# Patient Record
Sex: FEMALE | Race: WHITE | NOT HISPANIC OR LATINO | Employment: FULL TIME | URBAN - METROPOLITAN AREA
[De-identification: names, ages, dates, MRNs, and addresses within clinical notes are randomized per-mention and may not be internally consistent; named-entity substitution may affect disease eponyms.]

---

## 2019-07-08 ENCOUNTER — APPOINTMENT (OUTPATIENT)
Dept: CARDIOLOGY | Facility: HOSPITAL | Age: 54
End: 2019-07-08

## 2019-07-08 ENCOUNTER — HOSPITAL ENCOUNTER (INPATIENT)
Facility: HOSPITAL | Age: 54
LOS: 1 days | Discharge: HOME | End: 2019-07-09
Attending: INTERNAL MEDICINE | Admitting: INTERNAL MEDICINE

## 2019-07-08 ENCOUNTER — APPOINTMENT (OUTPATIENT)
Dept: GENERAL RADIOLOGY | Facility: HOSPITAL | Age: 54
End: 2019-07-08

## 2019-07-08 DIAGNOSIS — I21.3 ST ELEVATION MYOCARDIAL INFARCTION (STEMI), UNSPECIFIED ARTERY (HCC): Primary | ICD-10-CM

## 2019-07-08 DIAGNOSIS — I21.21 ST ELEVATION MYOCARDIAL INFARCTION INVOLVING LEFT CIRCUMFLEX CORONARY ARTERY (HCC): ICD-10-CM

## 2019-07-08 DIAGNOSIS — E78.5 HYPERLIPIDEMIA LDL GOAL <70: ICD-10-CM

## 2019-07-08 DIAGNOSIS — I10 ESSENTIAL HYPERTENSION: ICD-10-CM

## 2019-07-08 PROBLEM — Z72.0 TOBACCO ABUSE: Status: ACTIVE | Noted: 2019-07-08

## 2019-07-08 LAB
ACT BLD: 444 SECONDS (ref 82–152)
ALBUMIN SERPL-MCNC: 3.9 G/DL (ref 3.5–5.2)
ALBUMIN/GLOB SERPL: 1.4 G/DL
ALP SERPL-CCNC: 92 U/L (ref 39–117)
ALT SERPL W P-5'-P-CCNC: 31 U/L (ref 1–33)
ANION GAP SERPL CALCULATED.3IONS-SCNC: 11 MMOL/L (ref 5–15)
AST SERPL-CCNC: 115 U/L (ref 1–32)
BASOPHILS # BLD AUTO: 0.08 10*3/MM3 (ref 0–0.2)
BASOPHILS NFR BLD AUTO: 0.7 % (ref 0–1.5)
BH CV ECHO MEAS - AO MAX PG (FULL): 4.8 MMHG
BH CV ECHO MEAS - AO MAX PG: 8.9 MMHG
BH CV ECHO MEAS - AO MEAN PG (FULL): 2.2 MMHG
BH CV ECHO MEAS - AO MEAN PG: 4.6 MMHG
BH CV ECHO MEAS - AO ROOT AREA (BSA CORRECTED): 1.5
BH CV ECHO MEAS - AO ROOT AREA: 6.5 CM^2
BH CV ECHO MEAS - AO ROOT DIAM: 2.9 CM
BH CV ECHO MEAS - AO V2 MAX: 148.8 CM/SEC
BH CV ECHO MEAS - AO V2 MEAN: 96.4 CM/SEC
BH CV ECHO MEAS - AO V2 VTI: 39.4 CM
BH CV ECHO MEAS - ASC AORTA: 2.7 CM
BH CV ECHO MEAS - AVA(I,A): 2.1 CM^2
BH CV ECHO MEAS - AVA(I,D): 2.1 CM^2
BH CV ECHO MEAS - AVA(V,A): 2.1 CM^2
BH CV ECHO MEAS - AVA(V,D): 2.1 CM^2
BH CV ECHO MEAS - BSA(HAYCOCK): 2 M^2
BH CV ECHO MEAS - BSA: 1.9 M^2
BH CV ECHO MEAS - BZI_BMI: 29.9 KILOGRAMS/M^2
BH CV ECHO MEAS - BZI_METRIC_HEIGHT: 167.6 CM
BH CV ECHO MEAS - BZI_METRIC_WEIGHT: 83.9 KG
BH CV ECHO MEAS - EDV(CUBED): 88.1 ML
BH CV ECHO MEAS - EDV(MOD-SP2): 66 ML
BH CV ECHO MEAS - EDV(MOD-SP4): 66 ML
BH CV ECHO MEAS - EDV(TEICH): 90 ML
BH CV ECHO MEAS - EF(CUBED): 78.5 %
BH CV ECHO MEAS - EF(MOD-BP): 54 %
BH CV ECHO MEAS - EF(MOD-SP2): 53 %
BH CV ECHO MEAS - EF(MOD-SP4): 51.5 %
BH CV ECHO MEAS - EF(TEICH): 71 %
BH CV ECHO MEAS - ESV(CUBED): 18.9 ML
BH CV ECHO MEAS - ESV(MOD-SP2): 31 ML
BH CV ECHO MEAS - ESV(MOD-SP4): 32 ML
BH CV ECHO MEAS - ESV(TEICH): 26.1 ML
BH CV ECHO MEAS - FS: 40.1 %
BH CV ECHO MEAS - IVS/LVPW: 0.68
BH CV ECHO MEAS - IVSD: 0.87 CM
BH CV ECHO MEAS - LA DIMENSION: 3.7 CM
BH CV ECHO MEAS - LA/AO: 1.3
BH CV ECHO MEAS - LAD MAJOR: 5 CM
BH CV ECHO MEAS - LAT PEAK E' VEL: 6.3 CM/SEC
BH CV ECHO MEAS - LATERAL E/E' RATIO: 15.9
BH CV ECHO MEAS - LV DIASTOLIC VOL/BSA (35-75): 34.1 ML/M^2
BH CV ECHO MEAS - LV MASS(C)D: 165.8 GRAMS
BH CV ECHO MEAS - LV MASS(C)DI: 85.7 GRAMS/M^2
BH CV ECHO MEAS - LV MAX PG: 4 MMHG
BH CV ECHO MEAS - LV MEAN PG: 2.4 MMHG
BH CV ECHO MEAS - LV SYSTOLIC VOL/BSA (12-30): 16.5 ML/M^2
BH CV ECHO MEAS - LV V1 MAX: 100.2 CM/SEC
BH CV ECHO MEAS - LV V1 MEAN: 72.4 CM/SEC
BH CV ECHO MEAS - LV V1 VTI: 27 CM
BH CV ECHO MEAS - LVIDD: 4.4 CM
BH CV ECHO MEAS - LVIDS: 2.7 CM
BH CV ECHO MEAS - LVLD AP2: 7.1 CM
BH CV ECHO MEAS - LVLD AP4: 8 CM
BH CV ECHO MEAS - LVLS AP2: 6.5 CM
BH CV ECHO MEAS - LVLS AP4: 7 CM
BH CV ECHO MEAS - LVOT AREA (M): 3.1 CM^2
BH CV ECHO MEAS - LVOT AREA: 3.1 CM^2
BH CV ECHO MEAS - LVOT DIAM: 2 CM
BH CV ECHO MEAS - LVPWD: 1.3 CM
BH CV ECHO MEAS - MED PEAK E' VEL: 6 CM/SEC
BH CV ECHO MEAS - MEDIAL E/E' RATIO: 16.4
BH CV ECHO MEAS - MV A MAX VEL: 125.4 CM/SEC
BH CV ECHO MEAS - MV DEC TIME: 0.15 SEC
BH CV ECHO MEAS - MV E MAX VEL: 101.2 CM/SEC
BH CV ECHO MEAS - MV E/A: 0.81
BH CV ECHO MEAS - PA ACC SLOPE: 395.2 CM/SEC^2
BH CV ECHO MEAS - PA ACC TIME: 0.15 SEC
BH CV ECHO MEAS - PA MAX PG: 3.3 MMHG
BH CV ECHO MEAS - PA PR(ACCEL): 9.3 MMHG
BH CV ECHO MEAS - PA V2 MAX: 90.9 CM/SEC
BH CV ECHO MEAS - RVDD: 2.5 CM
BH CV ECHO MEAS - SI(AO): 133.1 ML/M^2
BH CV ECHO MEAS - SI(CUBED): 35.8 ML/M^2
BH CV ECHO MEAS - SI(LVOT): 43.7 ML/M^2
BH CV ECHO MEAS - SI(MOD-SP2): 18.1 ML/M^2
BH CV ECHO MEAS - SI(MOD-SP4): 17.6 ML/M^2
BH CV ECHO MEAS - SI(TEICH): 33 ML/M^2
BH CV ECHO MEAS - SV(AO): 257.5 ML
BH CV ECHO MEAS - SV(CUBED): 69.2 ML
BH CV ECHO MEAS - SV(LVOT): 84.6 ML
BH CV ECHO MEAS - SV(MOD-SP2): 35 ML
BH CV ECHO MEAS - SV(MOD-SP4): 34 ML
BH CV ECHO MEAS - SV(TEICH): 63.9 ML
BH CV ECHO MEAS - TAPSE (>1.6): 2.4 CM2
BH CV ECHO MEAS - TV MAX PG: 0.04 MMHG
BH CV ECHO MEAS - TV V2 MAX: 10.4 CM/SEC
BH CV ECHO MEASUREMENTS AVERAGE E/E' RATIO: 16.46
BH CV VAS BP RIGHT ARM: NORMAL MMHG
BH CV XLRA - RV BASE: 3.4 CM
BH CV XLRA - RV LENGTH: 7.4 CM
BH CV XLRA - RV MID: 3.2 CM
BH CV XLRA - TDI S': 10.3 CM/SEC
BILIRUB SERPL-MCNC: 0.3 MG/DL (ref 0.2–1.2)
BUN BLD-MCNC: 13 MG/DL (ref 6–20)
BUN/CREAT SERPL: 12.5 (ref 7–25)
CALCIUM SPEC-SCNC: 9 MG/DL (ref 8.6–10.5)
CHLORIDE SERPL-SCNC: 95 MMOL/L (ref 98–107)
CHOLEST SERPL-MCNC: 165 MG/DL (ref 0–200)
CO2 SERPL-SCNC: 25 MMOL/L (ref 22–29)
CREAT BLD-MCNC: 1.04 MG/DL (ref 0.57–1)
DEPRECATED RDW RBC AUTO: 49.5 FL (ref 37–54)
EOSINOPHIL # BLD AUTO: 0.03 10*3/MM3 (ref 0–0.4)
EOSINOPHIL NFR BLD AUTO: 0.3 % (ref 0.3–6.2)
ERYTHROCYTE [DISTWIDTH] IN BLOOD BY AUTOMATED COUNT: 14 % (ref 12.3–15.4)
GFR SERPL CREATININE-BSD FRML MDRD: 55 ML/MIN/1.73
GLOBULIN UR ELPH-MCNC: 2.7 GM/DL
GLUCOSE BLD-MCNC: 134 MG/DL (ref 65–99)
HBA1C MFR BLD: 5.7 % (ref 4.8–5.6)
HCT VFR BLD AUTO: 47.2 % (ref 34–46.6)
HDLC SERPL-MCNC: 49 MG/DL (ref 40–60)
HGB BLD-MCNC: 15.1 G/DL (ref 12–15.9)
IMM GRANULOCYTES # BLD AUTO: 0.07 10*3/MM3 (ref 0–0.05)
IMM GRANULOCYTES NFR BLD AUTO: 0.6 % (ref 0–0.5)
LDLC SERPL CALC-MCNC: 105 MG/DL (ref 0–100)
LDLC/HDLC SERPL: 2.13 {RATIO}
LIPASE SERPL-CCNC: 35 U/L (ref 13–60)
LV EF 2D ECHO EST: 60 %
LYMPHOCYTES # BLD AUTO: 1.12 10*3/MM3 (ref 0.7–3.1)
LYMPHOCYTES NFR BLD AUTO: 9.7 % (ref 19.6–45.3)
MAGNESIUM SERPL-MCNC: 1.8 MG/DL (ref 1.6–2.6)
MCH RBC QN AUTO: 30.9 PG (ref 26.6–33)
MCHC RBC AUTO-ENTMCNC: 32 G/DL (ref 31.5–35.7)
MCV RBC AUTO: 96.7 FL (ref 79–97)
MONOCYTES # BLD AUTO: 0.58 10*3/MM3 (ref 0.1–0.9)
MONOCYTES NFR BLD AUTO: 5 % (ref 5–12)
NEUTROPHILS # BLD AUTO: 9.67 10*3/MM3 (ref 1.7–7)
NEUTROPHILS NFR BLD AUTO: 83.7 % (ref 42.7–76)
NRBC BLD AUTO-RTO: 0 /100 WBC (ref 0–0.2)
NT-PROBNP SERPL-MCNC: 1508 PG/ML (ref 5–900)
PLATELET # BLD AUTO: 225 10*3/MM3 (ref 140–450)
PMV BLD AUTO: 10.5 FL (ref 6–12)
POTASSIUM BLD-SCNC: 4 MMOL/L (ref 3.5–5.2)
PROT SERPL-MCNC: 6.6 G/DL (ref 6–8.5)
RBC # BLD AUTO: 4.88 10*6/MM3 (ref 3.77–5.28)
SODIUM BLD-SCNC: 131 MMOL/L (ref 136–145)
TRIGL SERPL-MCNC: 57 MG/DL (ref 0–150)
TROPONIN T SERPL-MCNC: 7.1 NG/ML (ref 0–0.03)
TSH SERPL DL<=0.05 MIU/L-ACNC: 3.33 MIU/ML (ref 0.27–4.2)
VLDLC SERPL-MCNC: 11.4 MG/DL
WBC NRBC COR # BLD: 11.55 10*3/MM3 (ref 3.4–10.8)

## 2019-07-08 PROCEDURE — 83036 HEMOGLOBIN GLYCOSYLATED A1C: CPT | Performed by: INTERNAL MEDICINE

## 2019-07-08 PROCEDURE — 93010 ELECTROCARDIOGRAM REPORT: CPT | Performed by: INTERNAL MEDICINE

## 2019-07-08 PROCEDURE — 80061 LIPID PANEL: CPT | Performed by: INTERNAL MEDICINE

## 2019-07-08 PROCEDURE — 93454 CORONARY ARTERY ANGIO S&I: CPT | Performed by: INTERNAL MEDICINE

## 2019-07-08 PROCEDURE — 92941 PRQ TRLML REVSC TOT OCCL AMI: CPT | Performed by: INTERNAL MEDICINE

## 2019-07-08 PROCEDURE — 92978 ENDOLUMINL IVUS OCT C 1ST: CPT | Performed by: INTERNAL MEDICINE

## 2019-07-08 PROCEDURE — 4A023N7 MEASUREMENT OF CARDIAC SAMPLING AND PRESSURE, LEFT HEART, PERCUTANEOUS APPROACH: ICD-10-PCS | Performed by: INTERNAL MEDICINE

## 2019-07-08 PROCEDURE — 84443 ASSAY THYROID STIM HORMONE: CPT | Performed by: INTERNAL MEDICINE

## 2019-07-08 PROCEDURE — C1725 CATH, TRANSLUMIN NON-LASER: HCPCS | Performed by: INTERNAL MEDICINE

## 2019-07-08 PROCEDURE — 71045 X-RAY EXAM CHEST 1 VIEW: CPT

## 2019-07-08 PROCEDURE — 92979 ENDOLUMINL IVUS OCT C EA: CPT | Performed by: INTERNAL MEDICINE

## 2019-07-08 PROCEDURE — 25010000002 HEPARIN (PORCINE) PER 1000 UNITS: Performed by: EMERGENCY MEDICINE

## 2019-07-08 PROCEDURE — 0 IOPAMIDOL PER 1 ML: Performed by: INTERNAL MEDICINE

## 2019-07-08 PROCEDURE — 92928 PRQ TCAT PLMT NTRAC ST 1 LES: CPT | Performed by: INTERNAL MEDICINE

## 2019-07-08 PROCEDURE — C1769 GUIDE WIRE: HCPCS | Performed by: INTERNAL MEDICINE

## 2019-07-08 PROCEDURE — C1887 CATHETER, GUIDING: HCPCS | Performed by: INTERNAL MEDICINE

## 2019-07-08 PROCEDURE — 25010000002 FENTANYL CITRATE (PF) 100 MCG/2ML SOLUTION: Performed by: INTERNAL MEDICINE

## 2019-07-08 PROCEDURE — 93005 ELECTROCARDIOGRAM TRACING: CPT | Performed by: INTERNAL MEDICINE

## 2019-07-08 PROCEDURE — 83735 ASSAY OF MAGNESIUM: CPT | Performed by: INTERNAL MEDICINE

## 2019-07-08 PROCEDURE — 25010000002 MIDAZOLAM PER 1 MG: Performed by: INTERNAL MEDICINE

## 2019-07-08 PROCEDURE — C9606 PERC D-E COR REVASC W AMI S: HCPCS | Performed by: INTERNAL MEDICINE

## 2019-07-08 PROCEDURE — 93005 ELECTROCARDIOGRAM TRACING: CPT

## 2019-07-08 PROCEDURE — 93306 TTE W/DOPPLER COMPLETE: CPT | Performed by: INTERNAL MEDICINE

## 2019-07-08 PROCEDURE — C1874 STENT, COATED/COV W/DEL SYS: HCPCS | Performed by: INTERNAL MEDICINE

## 2019-07-08 PROCEDURE — 83880 ASSAY OF NATRIURETIC PEPTIDE: CPT | Performed by: EMERGENCY MEDICINE

## 2019-07-08 PROCEDURE — C1894 INTRO/SHEATH, NON-LASER: HCPCS | Performed by: INTERNAL MEDICINE

## 2019-07-08 PROCEDURE — 84484 ASSAY OF TROPONIN QUANT: CPT | Performed by: INTERNAL MEDICINE

## 2019-07-08 PROCEDURE — C9600 PERC DRUG-EL COR STENT SING: HCPCS | Performed by: INTERNAL MEDICINE

## 2019-07-08 PROCEDURE — 85025 COMPLETE CBC W/AUTO DIFF WBC: CPT | Performed by: EMERGENCY MEDICINE

## 2019-07-08 PROCEDURE — 99284 EMERGENCY DEPT VISIT MOD MDM: CPT

## 2019-07-08 PROCEDURE — B2111ZZ FLUOROSCOPY OF MULTIPLE CORONARY ARTERIES USING LOW OSMOLAR CONTRAST: ICD-10-PCS | Performed by: INTERNAL MEDICINE

## 2019-07-08 PROCEDURE — 25010000002 SULFUR HEXAFLUORIDE MICROSPH 60.7-25 MG RECONSTITUTED SUSPENSION: Performed by: INTERNAL MEDICINE

## 2019-07-08 PROCEDURE — 85347 COAGULATION TIME ACTIVATED: CPT

## 2019-07-08 PROCEDURE — 99223 1ST HOSP IP/OBS HIGH 75: CPT | Performed by: INTERNAL MEDICINE

## 2019-07-08 PROCEDURE — 93005 ELECTROCARDIOGRAM TRACING: CPT | Performed by: EMERGENCY MEDICINE

## 2019-07-08 PROCEDURE — 027135Z DILATION OF CORONARY ARTERY, TWO ARTERIES WITH TWO DRUG-ELUTING INTRALUMINAL DEVICES, PERCUTANEOUS APPROACH: ICD-10-PCS | Performed by: INTERNAL MEDICINE

## 2019-07-08 PROCEDURE — 25010000002 HEPARIN (PORCINE) PER 1000 UNITS: Performed by: INTERNAL MEDICINE

## 2019-07-08 PROCEDURE — 80053 COMPREHEN METABOLIC PANEL: CPT | Performed by: EMERGENCY MEDICINE

## 2019-07-08 PROCEDURE — C1757 CATH, THROMBECTOMY/EMBOLECT: HCPCS | Performed by: INTERNAL MEDICINE

## 2019-07-08 PROCEDURE — B221Z2Z COMPUTERIZED TOMOGRAPHY (CT SCAN) OF MULTIPLE CORONARY ARTERIES USING INTRAVASCULAR OPTICAL COHERENCE: ICD-10-PCS | Performed by: INTERNAL MEDICINE

## 2019-07-08 PROCEDURE — 93306 TTE W/DOPPLER COMPLETE: CPT

## 2019-07-08 PROCEDURE — C1753 CATH, INTRAVAS ULTRASOUND: HCPCS | Performed by: INTERNAL MEDICINE

## 2019-07-08 PROCEDURE — 83690 ASSAY OF LIPASE: CPT | Performed by: EMERGENCY MEDICINE

## 2019-07-08 DEVICE — XIENCE SIERRA™ EVEROLIMUS ELUTING CORONARY STENT SYSTEM 2.75 MM X 33 MM / RAPID-EXCHANGE
Type: IMPLANTABLE DEVICE | Status: FUNCTIONAL
Brand: XIENCE SIERRA™

## 2019-07-08 DEVICE — XIENCE SIERRA™ EVEROLIMUS ELUTING CORONARY STENT SYSTEM 3.00 MM X 28 MM / RAPID-EXCHANGE
Type: IMPLANTABLE DEVICE | Status: FUNCTIONAL
Brand: XIENCE SIERRA™

## 2019-07-08 RX ORDER — LIDOCAINE HYDROCHLORIDE 10 MG/ML
INJECTION, SOLUTION EPIDURAL; INFILTRATION; INTRACAUDAL; PERINEURAL AS NEEDED
Status: DISCONTINUED | OUTPATIENT
Start: 2019-07-08 | End: 2019-07-08 | Stop reason: HOSPADM

## 2019-07-08 RX ORDER — HEPARIN SODIUM 1000 [USP'U]/ML
INJECTION, SOLUTION INTRAVENOUS; SUBCUTANEOUS AS NEEDED
Status: DISCONTINUED | OUTPATIENT
Start: 2019-07-08 | End: 2019-07-08 | Stop reason: HOSPADM

## 2019-07-08 RX ORDER — MIDAZOLAM HYDROCHLORIDE 1 MG/ML
INJECTION INTRAMUSCULAR; INTRAVENOUS AS NEEDED
Status: DISCONTINUED | OUTPATIENT
Start: 2019-07-08 | End: 2019-07-08 | Stop reason: HOSPADM

## 2019-07-08 RX ORDER — ATORVASTATIN CALCIUM 80 MG/1
80 TABLET, FILM COATED ORAL NIGHTLY
Qty: 90 TABLET | Refills: 3 | Status: CANCELLED | OUTPATIENT
Start: 2019-07-08

## 2019-07-08 RX ORDER — LISINOPRIL AND HYDROCHLOROTHIAZIDE 25; 20 MG/1; MG/1
1 TABLET ORAL DAILY
COMMUNITY
End: 2019-07-09 | Stop reason: HOSPADM

## 2019-07-08 RX ORDER — IBUPROFEN 200 MG
200 TABLET ORAL EVERY 6 HOURS PRN
COMMUNITY
End: 2019-07-09 | Stop reason: HOSPADM

## 2019-07-08 RX ORDER — CITALOPRAM 40 MG/1
40 TABLET ORAL DAILY
COMMUNITY
End: 2019-07-16

## 2019-07-08 RX ORDER — MAGNESIUM SULFATE HEPTAHYDRATE 40 MG/ML
2 INJECTION, SOLUTION INTRAVENOUS AS NEEDED
Status: DISCONTINUED | OUTPATIENT
Start: 2019-07-08 | End: 2019-07-09 | Stop reason: HOSPADM

## 2019-07-08 RX ORDER — POTASSIUM CHLORIDE 750 MG/1
40 CAPSULE, EXTENDED RELEASE ORAL AS NEEDED
Status: DISCONTINUED | OUTPATIENT
Start: 2019-07-08 | End: 2019-07-09 | Stop reason: HOSPADM

## 2019-07-08 RX ORDER — LISINOPRIL 20 MG/1
20 TABLET ORAL DAILY
Status: DISCONTINUED | OUTPATIENT
Start: 2019-07-08 | End: 2019-07-09

## 2019-07-08 RX ORDER — SODIUM CHLORIDE 0.9 % (FLUSH) 0.9 %
3-10 SYRINGE (ML) INJECTION AS NEEDED
Status: DISCONTINUED | OUTPATIENT
Start: 2019-07-08 | End: 2019-07-09 | Stop reason: HOSPADM

## 2019-07-08 RX ORDER — ONDANSETRON 2 MG/ML
4 INJECTION INTRAMUSCULAR; INTRAVENOUS EVERY 6 HOURS PRN
Status: DISCONTINUED | OUTPATIENT
Start: 2019-07-08 | End: 2019-07-09 | Stop reason: HOSPADM

## 2019-07-08 RX ORDER — SODIUM CHLORIDE 0.9 % (FLUSH) 0.9 %
10 SYRINGE (ML) INJECTION AS NEEDED
Status: DISCONTINUED | OUTPATIENT
Start: 2019-07-08 | End: 2019-07-09 | Stop reason: HOSPADM

## 2019-07-08 RX ORDER — MAGNESIUM SULFATE HEPTAHYDRATE 40 MG/ML
4 INJECTION, SOLUTION INTRAVENOUS AS NEEDED
Status: DISCONTINUED | OUTPATIENT
Start: 2019-07-08 | End: 2019-07-09 | Stop reason: HOSPADM

## 2019-07-08 RX ORDER — PRASUGREL 5 MG/1
TABLET, FILM COATED ORAL AS NEEDED
Status: DISCONTINUED | OUTPATIENT
Start: 2019-07-08 | End: 2019-07-08 | Stop reason: HOSPADM

## 2019-07-08 RX ORDER — ATORVASTATIN CALCIUM 40 MG/1
80 TABLET, FILM COATED ORAL NIGHTLY
Status: DISCONTINUED | OUTPATIENT
Start: 2019-07-08 | End: 2019-07-09 | Stop reason: HOSPADM

## 2019-07-08 RX ORDER — FENTANYL CITRATE 50 UG/ML
INJECTION, SOLUTION INTRAMUSCULAR; INTRAVENOUS AS NEEDED
Status: DISCONTINUED | OUTPATIENT
Start: 2019-07-08 | End: 2019-07-08 | Stop reason: HOSPADM

## 2019-07-08 RX ORDER — ASPIRIN 81 MG/1
324 TABLET, CHEWABLE ORAL ONCE
Status: DISCONTINUED | OUTPATIENT
Start: 2019-07-08 | End: 2019-07-09 | Stop reason: HOSPADM

## 2019-07-08 RX ORDER — GABAPENTIN 100 MG/1
400 CAPSULE ORAL 3 TIMES DAILY
COMMUNITY

## 2019-07-08 RX ORDER — ASPIRIN 81 MG/1
81 TABLET ORAL DAILY
Status: DISCONTINUED | OUTPATIENT
Start: 2019-07-09 | End: 2019-07-09 | Stop reason: HOSPADM

## 2019-07-08 RX ORDER — SODIUM CHLORIDE 0.9 % (FLUSH) 0.9 %
3 SYRINGE (ML) INJECTION EVERY 12 HOURS SCHEDULED
Status: DISCONTINUED | OUTPATIENT
Start: 2019-07-08 | End: 2019-07-09 | Stop reason: HOSPADM

## 2019-07-08 RX ORDER — CLOPIDOGREL BISULFATE 75 MG/1
TABLET ORAL
Status: COMPLETED | OUTPATIENT
Start: 2019-07-08 | End: 2019-07-08

## 2019-07-08 RX ORDER — CLOPIDOGREL BISULFATE 75 MG/1
75 TABLET ORAL DAILY
Status: DISCONTINUED | OUTPATIENT
Start: 2019-07-08 | End: 2019-07-08

## 2019-07-08 RX ORDER — CLOPIDOGREL BISULFATE 75 MG/1
75 TABLET ORAL DAILY
Qty: 90 TABLET | Refills: 3 | Status: CANCELLED | OUTPATIENT
Start: 2019-07-09

## 2019-07-08 RX ORDER — LORAZEPAM 1 MG/1
1 TABLET ORAL EVERY 6 HOURS PRN
Status: DISCONTINUED | OUTPATIENT
Start: 2019-07-08 | End: 2019-07-09 | Stop reason: HOSPADM

## 2019-07-08 RX ORDER — HYDROCODONE BITARTRATE AND ACETAMINOPHEN 7.5; 325 MG/1; MG/1
1 TABLET ORAL EVERY 4 HOURS PRN
Status: DISCONTINUED | OUTPATIENT
Start: 2019-07-08 | End: 2019-07-09 | Stop reason: HOSPADM

## 2019-07-08 RX ORDER — ACETAMINOPHEN 325 MG/1
650 TABLET ORAL EVERY 4 HOURS PRN
Status: DISCONTINUED | OUTPATIENT
Start: 2019-07-08 | End: 2019-07-09 | Stop reason: HOSPADM

## 2019-07-08 RX ORDER — SODIUM CHLORIDE 9 MG/ML
1.5 INJECTION, SOLUTION INTRAVENOUS CONTINUOUS
Status: ACTIVE | OUTPATIENT
Start: 2019-07-08 | End: 2019-07-08

## 2019-07-08 RX ORDER — POTASSIUM CHLORIDE 1.5 G/1.77G
40 POWDER, FOR SOLUTION ORAL AS NEEDED
Status: DISCONTINUED | OUTPATIENT
Start: 2019-07-08 | End: 2019-07-09 | Stop reason: HOSPADM

## 2019-07-08 RX ORDER — HEPARIN SODIUM 5000 [USP'U]/ML
INJECTION, SOLUTION INTRAVENOUS; SUBCUTANEOUS
Status: COMPLETED | OUTPATIENT
Start: 2019-07-08 | End: 2019-07-08

## 2019-07-08 RX ORDER — ONDANSETRON 4 MG/1
4 TABLET, FILM COATED ORAL EVERY 6 HOURS PRN
Status: DISCONTINUED | OUTPATIENT
Start: 2019-07-08 | End: 2019-07-09 | Stop reason: HOSPADM

## 2019-07-08 RX ORDER — CITALOPRAM 40 MG/1
40 TABLET ORAL DAILY
Status: DISCONTINUED | OUTPATIENT
Start: 2019-07-08 | End: 2019-07-09 | Stop reason: HOSPADM

## 2019-07-08 RX ORDER — CLOPIDOGREL BISULFATE 75 MG/1
75 TABLET ORAL DAILY
Status: DISCONTINUED | OUTPATIENT
Start: 2019-07-09 | End: 2019-07-09 | Stop reason: HOSPADM

## 2019-07-08 RX ADMIN — CLOPIDOGREL BISULFATE 600 MG: 75 TABLET ORAL at 05:25

## 2019-07-08 RX ADMIN — CITALOPRAM HYDROBROMIDE 40 MG: 40 TABLET ORAL at 08:10

## 2019-07-08 RX ADMIN — ATORVASTATIN CALCIUM 80 MG: 40 TABLET, FILM COATED ORAL at 20:11

## 2019-07-08 RX ADMIN — SULFUR HEXAFLUORIDE 2 ML: KIT at 10:30

## 2019-07-08 RX ADMIN — METOPROLOL TARTRATE 25 MG: 25 TABLET ORAL at 20:11

## 2019-07-08 RX ADMIN — METOPROLOL TARTRATE 25 MG: 25 TABLET ORAL at 08:10

## 2019-07-08 RX ADMIN — SODIUM CHLORIDE, PRESERVATIVE FREE 3 ML: 5 INJECTION INTRAVENOUS at 20:11

## 2019-07-08 RX ADMIN — HEPARIN SODIUM 5034 UNITS: 5000 INJECTION, SOLUTION INTRAVENOUS; SUBCUTANEOUS at 05:26

## 2019-07-08 RX ADMIN — LISINOPRIL 20 MG: 20 TABLET ORAL at 08:10

## 2019-07-08 RX ADMIN — SODIUM CHLORIDE, PRESERVATIVE FREE 3 ML: 5 INJECTION INTRAVENOUS at 08:11

## 2019-07-08 NOTE — NURSING NOTE
Order received for Phase II Cardiac Rehab. Patient is very drowsy at this time.  Staff will follow up later this afternoon or tomorrow when patient is more alert.

## 2019-07-08 NOTE — H&P
[facility] at [facility]  Cardiology H&P note      Chief Complaint/Reason for service:    · Inferolateral STEMI         The patient is a 54-year-old female with no prior cardiac history but risk factors including hypertension and 1 pack a day smoking.  Yesterday, the patient developed substernal chest discomfort at around noon.  The pain persisted throughout the day.  The patient took her blood pressure medications thinking that it may help.  However, this morning the pain persisted despite taking extra blood pressure pills and so she called EMS.  On arrival, inferolateral ST elevation was noted on EKG and code AMI was activated.  On arrival to [facility] ER, STEMI was confirmed with a twelve-lead EKG and she was loaded with aspirin and clopidogrel.    On arrival to the Cath Lab, the patient complained of 6/10 chest pain.  Cardiac catheterization was performed via the left radial approach.  She was found to have 100% occlusion of the mid circumflex which was treated with a drug-eluting stent.  She was also noted to have a high-grade stenosis of the proximal LAD and this was also treated using a Xience drug-eluting stent.  No left ventriculography was performed.    Past medical, surgical, social and family history reviewed in the patient's electronic medical record.    Review of Systems:   All systems were reviewed and negative except for:  Musculoskeletal: positive for  Muscle spasms and as noted above       Vital Sign Min/Max for last 24 hours  No Data Recorded   BP  Min: 113/97  Max: 147/91   Pulse  Min: 82  Max: 86   Resp  Min: 16  Max: 18   SpO2  Min: 97 %  Max: 99 %   Flow (L/min)  Min: 15  Max: 15    No intake or output data in the 24 hours ending 07/08/19 0735        Physical Exam   Constitutional: She appears well-developed.   HENT:   Head: Normocephalic and atraumatic.   Eyes: Conjunctivae are normal.   Neck: Neck supple. No JVD present. No thyromegaly present.   Cardiovascular: Normal  rate and regular rhythm. Exam reveals no gallop.   No murmur heard.  Pulses:       Carotid pulses are 2+ on the right side, and 2+ on the left side.  Pulmonary/Chest: Effort normal and breath sounds normal.   Abdominal: Soft. There is no tenderness.   Musculoskeletal: She exhibits no edema.   Skin: Skin is warm and dry.   Psychiatric: She has a normal mood and affect. Her behavior is normal.       Tele: Sinus    Results Review (reviewed the patient's recent labs in the electronic medical record):     EKG: Sinus rhythm.  ST elevation noted in leads II, III and aVF             Active Hospital Problems    Diagnosis POA   • **ST elevation myocardial infarction involving left circumflex coronary artery (CMS/HCC) [I21.21] Yes     Priority: High     · Cardiac catheterization for inferior lateral STEMI (7/8/2019): Severe 3-vessel CAD (LCx, LAD, and RPDA).  Culprit for STEMI was 100% occlusion of mid LCx status post Xience GERTRUDE.  Severe proximal LAD stenosis status post Xience GERTRUDE.     • Hyperlipidemia LDL goal <70 [E78.5] Yes   • Tobacco abuse [Z72.0] Yes   • Essential hypertension [I10] Yes              · Admit to telemetry  · DAPT until 7/2020  · Beta-blocker and ACE inhibitor therapy  · Echo today to evaluate LVEF  · Smoking cessation  · Probable discharge home tomorrow    Vimal Nemwan IV, MD  7/8/2019

## 2019-07-08 NOTE — PROGRESS NOTES
[facility] at [facility]  Cardiology Progress Note      Chief Complaint/Reason for service:    · STEMI         Patient seen post PCI earlier this morning after presenting as an ST elevation myocardial infarction.  Coronary angiography revealed severe three-vessel disease but the culprit of her STEMI was 100% occlusion of the mid circumflex that was treated with a drug-eluting stent.  She also received a drug-eluting stent to her proximal LAD.    Past medical, surgical, social and family history reviewed in the patient's electronic medical record.           Vital Sign Min/Max for last 24 hours  No Data Recorded   BP  Min: 113/97  Max: 147/91   Pulse  Min: 82  Max: 86   Resp  Min: 16  Max: 18   SpO2  Min: 97 %  Max: 99 %   Flow (L/min)  Min: 15  Max: 15    No intake or output data in the 24 hours ending 07/08/19 0912        Physical Exam   Constitutional: She is oriented to person, place, and time. She appears well-developed and well-nourished.   HENT:   Head: Normocephalic.   Neck: No JVD present. Carotid bruit is not present.   Cardiovascular: Normal rate, regular rhythm, normal heart sounds and intact distal pulses. Exam reveals no gallop and no friction rub.   No murmur heard.  Pulmonary/Chest: Effort normal and breath sounds normal.   Abdominal: Soft.   Musculoskeletal: She exhibits no edema.   Neurological: She is alert and oriented to person, place, and time.   Skin: Skin is warm and dry. No cyanosis. Nails show no clubbing.   Psychiatric: She has a normal mood and affect. Her behavior is normal.       Tele: Normal sinus rhythm    Results Review (reviewed the patient's recent labs in the electronic medical record):           Results from last 7 days   Lab Units 07/08/19  0738   SODIUM mmol/L 131*   POTASSIUM mmol/L 4.0   CHLORIDE mmol/L 95*   BUN mg/dL 13   CREATININE mg/dL 1.04*   MAGNESIUM mg/dL 1.8     Results from last 7 days   Lab Units 07/08/19  0738   TROPONIN T ng/mL 7.100*      Results from last 7 days   Lab Units 07/08/19  0738   WBC 10*3/mm3 11.55*   HEMOGLOBIN g/dL 15.1   HEMATOCRIT % 47.2*   PLATELETS 10*3/mm3 225       Lab Results   Component Value Date    HGBA1C 5.70 (H) 07/08/2019       Lab Results   Component Value Date    CHOL 165 07/08/2019    TRIG 57 07/08/2019    HDL 49 07/08/2019     (H) 07/08/2019              Active Hospital Problems    Diagnosis POA   • **ST elevation myocardial infarction involving left circumflex coronary artery (CMS/HCC) [I21.21] Yes     · Cardiac catheterization for inferior lateral STEMI (7/8/2019): Severe 3-vessel CAD (LCx, LAD, and RPDA).  Culprit for STEMI was 100% occlusion of mid LCx status post Xience GERTRUDE.  Severe proximal LAD stenosis status post Xience GERTRUDE.     • Hyperlipidemia LDL goal <70 [E78.5] Yes   • Tobacco abuse [Z72.0] Yes   • Essential hypertension [I10] Yes              · Dual antiplatelet therapy for 1 year  · Echocardiogram today  · Continue lisinopril and metoprolol  · Recommend immediate smoking cessation and other lifestyle modification to reduce risk factors for future cardiovascular events    Jenni Broderick, APRN  7/8/2019

## 2019-07-08 NOTE — ED PROVIDER NOTES
Subjective   Patient is a pleasant 54-year-old female with history of hypertension, hyperlipidemia and smoking who presents today with chest pain.  She states that yesterday approximately 1 PM in the afternoon she developed chest pain which progressively worsened overnight.  She eventually called EMS and upon their arrival was noted to have ST elevation in the inferior leads.  STEMI protocol was initiated in the Cath Lab was called in per protocol. She was given 41 mg aspirin, three 0.4 sublingual nitroglycerin, 0.5 Dilaudid, and 12.5 of Phenergan en route to the hospital.  She states that her pain was initially 10 out of 10 and is currently 6 out of 10 following the sublingual nitroglycerin.  She did have nausea associated with the pain.  Is unclear whether she was diaphoretic.         History provided by:  EMS personnel and patient  History limited by:  Acuity of condition  Chest Pain   Pain location:  L chest  Pain quality: sharp    Pain severity:  Severe  Onset quality:  Gradual  Progression:  Worsening  Chronicity:  New  Context: at rest    Relieved by:  Nitroglycerin (Partial relief with sublingual nitroglycerin en route)  Worsened by:  Nothing  Ineffective treatments:  None tried      Review of Systems   Unable to perform ROS: Acuity of condition   Cardiovascular: Positive for chest pain.       No past medical history on file.    No Known Allergies    No past surgical history on file.    No family history on file.             Objective   Physical Exam   Constitutional: She is oriented to person, place, and time. She appears well-developed and well-nourished.   Patient appears uncomfortable but in no acute distress   HENT:   Head: Normocephalic and atraumatic.   Eyes: Conjunctivae and EOM are normal. Pupils are equal, round, and reactive to light.   Neck: Normal range of motion. Neck supple.   Cardiovascular: Normal rate, regular rhythm, normal heart sounds and normal pulses. Exam reveals no gallop and no  friction rub.   No murmur heard.  Pulmonary/Chest: Effort normal and breath sounds normal. No respiratory distress. She exhibits no tenderness.   Abdominal: Soft.   Musculoskeletal: Normal range of motion. She exhibits no edema.   Neurological: She is alert and oriented to person, place, and time.   Skin: Skin is warm and dry. Capillary refill takes less than 2 seconds.   Psychiatric: Her mood appears anxious.   Nursing note and vitals reviewed.      Procedures           EKG from EMS was reviewed and consistent with inferior ST elevation MI.  I attempted to call Dr. Newman twice and it went directly to his voicemail.  Thinking about it now this is likely because he did not have a cell signal as he was already in the hospital at the time of my call.  STEMI protocol was initiated including heparin bolus and Plavix administration.  Cath Lab was opened shortly after patient's arrival and she was transferred up for further management.          [facility]      Final diagnoses:   ST elevation myocardial infarction (STEMI), unspecified artery (CMS/[facility])            Godfrey Stephens DO  07/11/19 3501

## 2019-07-08 NOTE — PROGRESS NOTES
Discharge Planning Assessment  [facility]     Patient Name: Rosario Marroquin  MRN: [id number]  Today's Date: 7/8/2019    Admit Date: 7/8/2019    Discharge Needs Assessment     Row Name 07/08/19 0950       Living Environment    Lives With  alone    Current Living Arrangements  home/apartment/condo Lives in an apartment in [place]    Primary Care Provided by  self    Provides Primary Care For  no one    Family Caregiver if Needed  other relative(s);other (see comments)    Family Caregiver Names  Aunt can transport    Quality of Family Relationships  unable to assess    Able to Return to Prior Arrangements  yes       Resource/Environmental Concerns    Resource/Environmental Concerns  none    Transportation Concerns  car, none       Transition Planning    Patient/Family Anticipates Transition to  home    Patient/Family Anticipated Services at Transition      Transportation Anticipated  family or friend will provide       Discharge Needs Assessment    Concerns to be Addressed  financial/insurance    Equipment Currently Used at Home  none    Anticipated Changes Related to Illness  none    Equipment Needed After Discharge  none    Current Discharge Risk  lives alone        Discharge Plan     Row Name 07/08/19 0952       Plan    Plan  Home    Patient/Family in Agreement with Plan  yes    Plan Comments  Met with pt at . She lives in [place] [place] alone. Ind of ADL's and reports she is employed. She recently moved from [place] and has not established a PCP or have medical insurance. She states her meds are affordable and gets at the local [facility] in [place]. She is agreeable to have CM assist with a PCP. Spoke to Mony in [employer] and they will screen her today to see if she is eligible for Medicaid and if so will call for a PCP appt. She reports her Aunt will transport her home. She is agreeable to Meds to Beds, order placed. CM will cont to follow for d/c needs. Leny Worthington RN, CM ext. [phone]     Final Discharge Disposition Code  01 - home or self-care        Destination      No service coordination in this encounter.      Durable Medical Equipment      No service coordination in this encounter.      Dialysis/Infusion      No service coordination in this encounter.      Home Medical Care      No service coordination in this encounter.      Therapy      No service coordination in this encounter.      Community Resources      No service coordination in this encounter.        Expected Discharge Date and Time     Expected Discharge Date Expected Discharge Time    Jul 9, 2019         Demographic Summary     Row Name 07/08/19 0949       General Information    Referral Source  admission list    Preferred Language  English     Used During This Interaction  no    General Information Comments  Pt does not have a PCP.        Contact Information    Permission Granted to Share Info With          Functional Status     Row Name 07/08/19 0950       Functional Status    Usual Activity Tolerance  good    Current Activity Tolerance  good       Functional Status, IADL    Medications  independent    Meal Preparation  independent    Housekeeping  independent    Laundry  independent    Shopping  independent       Employment/    Employment Status  employed full time    Employment/ Comments  Pt has no insurance.         Psychosocial    No documentation.       Abuse/Neglect    No documentation.       Legal    No documentation.       Substance Abuse    No documentation.       Patient Forms    No documentation.           Leny Worthington

## 2019-07-08 NOTE — PLAN OF CARE
Problem: Patient Care Overview  Goal: Plan of Care Review  Outcome: Ongoing (interventions implemented as appropriate)   07/08/19 5750   Coping/Psychosocial   Plan of Care Reviewed With patient   Plan of Care Review   Progress improving       Problem: Cardiac: ACS (Acute Coronary Syndrome) (Adult)  Goal: Signs and Symptoms of Listed Potential Problems Will be Absent, Minimized or Managed (Cardiac: ACS)  Outcome: Ongoing (interventions implemented as appropriate)

## 2019-07-09 VITALS
TEMPERATURE: 98.4 F | WEIGHT: 185 LBS | DIASTOLIC BLOOD PRESSURE: 70 MMHG | HEART RATE: 69 BPM | HEIGHT: 66 IN | RESPIRATION RATE: 18 BRPM | OXYGEN SATURATION: 100 % | SYSTOLIC BLOOD PRESSURE: 111 MMHG | BODY MASS INDEX: 29.73 KG/M2

## 2019-07-09 LAB
ANION GAP SERPL CALCULATED.3IONS-SCNC: 15 MMOL/L (ref 5–15)
BUN BLD-MCNC: 14 MG/DL (ref 6–20)
BUN/CREAT SERPL: 13.5 (ref 7–25)
CALCIUM SPEC-SCNC: 9.4 MG/DL (ref 8.6–10.5)
CHLORIDE SERPL-SCNC: 97 MMOL/L (ref 98–107)
CO2 SERPL-SCNC: 23 MMOL/L (ref 22–29)
CREAT BLD-MCNC: 1.04 MG/DL (ref 0.57–1)
DEPRECATED RDW RBC AUTO: 51.6 FL (ref 37–54)
ERYTHROCYTE [DISTWIDTH] IN BLOOD BY AUTOMATED COUNT: 14.4 % (ref 12.3–15.4)
GFR SERPL CREATININE-BSD FRML MDRD: 55 ML/MIN/1.73
GLUCOSE BLD-MCNC: 199 MG/DL (ref 65–99)
HCT VFR BLD AUTO: 47.2 % (ref 34–46.6)
HGB BLD-MCNC: 15 G/DL (ref 12–15.9)
MCH RBC QN AUTO: 30.9 PG (ref 26.6–33)
MCHC RBC AUTO-ENTMCNC: 31.8 G/DL (ref 31.5–35.7)
MCV RBC AUTO: 97.3 FL (ref 79–97)
PLATELET # BLD AUTO: 236 10*3/MM3 (ref 140–450)
PMV BLD AUTO: 10.8 FL (ref 6–12)
POTASSIUM BLD-SCNC: 3.9 MMOL/L (ref 3.5–5.2)
RBC # BLD AUTO: 4.85 10*6/MM3 (ref 3.77–5.28)
SODIUM BLD-SCNC: 135 MMOL/L (ref 136–145)
WBC NRBC COR # BLD: 8.39 10*3/MM3 (ref 3.4–10.8)

## 2019-07-09 PROCEDURE — 80048 BASIC METABOLIC PNL TOTAL CA: CPT | Performed by: INTERNAL MEDICINE

## 2019-07-09 PROCEDURE — 99231 SBSQ HOSP IP/OBS SF/LOW 25: CPT | Performed by: INTERNAL MEDICINE

## 2019-07-09 PROCEDURE — 85027 COMPLETE CBC AUTOMATED: CPT | Performed by: INTERNAL MEDICINE

## 2019-07-09 RX ORDER — LISINOPRIL 5 MG/1
5 TABLET ORAL DAILY
Status: DISCONTINUED | OUTPATIENT
Start: 2019-07-10 | End: 2019-07-09 | Stop reason: HOSPADM

## 2019-07-09 RX ORDER — LISINOPRIL 5 MG/1
5 TABLET ORAL DAILY
Qty: 90 TABLET | Refills: 0 | Status: SHIPPED | OUTPATIENT
Start: 2019-07-10

## 2019-07-09 RX ORDER — ATORVASTATIN CALCIUM 80 MG/1
80 TABLET, FILM COATED ORAL NIGHTLY
Qty: 90 TABLET | Refills: 0 | Status: SHIPPED | OUTPATIENT
Start: 2019-07-09

## 2019-07-09 RX ORDER — LISINOPRIL 10 MG/1
10 TABLET ORAL DAILY
Qty: 90 TABLET | Refills: 0 | Status: SHIPPED | OUTPATIENT
Start: 2019-07-09 | End: 2019-07-09 | Stop reason: HOSPADM

## 2019-07-09 RX ORDER — LISINOPRIL 10 MG/1
10 TABLET ORAL DAILY
Status: DISCONTINUED | OUTPATIENT
Start: 2019-07-10 | End: 2019-07-09

## 2019-07-09 RX ORDER — ASPIRIN 81 MG/1
81 TABLET ORAL DAILY
Qty: 90 TABLET | Refills: 3 | Status: SHIPPED | OUTPATIENT
Start: 2019-07-09 | End: 2019-07-10

## 2019-07-09 RX ORDER — NITROGLYCERIN 0.4 MG/1
TABLET SUBLINGUAL
Qty: 25 TABLET | Refills: 11 | Status: SHIPPED | OUTPATIENT
Start: 2019-07-09 | End: 2019-07-10 | Stop reason: SDUPTHER

## 2019-07-09 RX ORDER — CLOPIDOGREL BISULFATE 75 MG/1
75 TABLET ORAL DAILY
Qty: 90 TABLET | Refills: 3 | Status: SHIPPED | OUTPATIENT
Start: 2019-07-09 | End: 2019-07-10

## 2019-07-09 RX ORDER — LISINOPRIL 5 MG/1
5 TABLET ORAL DAILY
Status: DISCONTINUED | OUTPATIENT
Start: 2019-07-09 | End: 2019-07-09

## 2019-07-09 RX ADMIN — CITALOPRAM HYDROBROMIDE 40 MG: 40 TABLET ORAL at 08:27

## 2019-07-09 RX ADMIN — SODIUM CHLORIDE, PRESERVATIVE FREE 3 ML: 5 INJECTION INTRAVENOUS at 08:30

## 2019-07-09 RX ADMIN — LISINOPRIL 5 MG: 5 TABLET ORAL at 08:30

## 2019-07-09 RX ADMIN — CLOPIDOGREL BISULFATE 75 MG: 75 TABLET ORAL at 08:26

## 2019-07-09 RX ADMIN — METOPROLOL TARTRATE 25 MG: 25 TABLET ORAL at 08:27

## 2019-07-09 RX ADMIN — ASPIRIN 81 MG: 81 TABLET, COATED ORAL at 08:27

## 2019-07-09 NOTE — PROGRESS NOTES
Pt. Referred for Phase II Cardiac Rehab. Staff discussed benefits of exercise, program protocol, and educational material provided. Teach back verified.  Permission granted from patient for staff to fax referral information to outlying program at this time.  Staff to fax referral info to [facility].

## 2019-07-09 NOTE — PROGRESS NOTES
Continued Stay Note  [facility]     Patient Name: Rosario Marroquin  MRN: [id number]  Today's Date: 7/9/2019    Admit Date: 7/8/2019    Discharge Plan     Row Name 07/09/19 1304       Plan    Plan  Home    Patient/Family in Agreement with Plan  yes    Plan Comments  PCP appt made with Francheska HOOKER at [facility] for Tuesday 7/23 at 9am and placed in AVS. Please have patient bring her ID card, insurance card and all meds she is taking. Pt's aunt will transport home from hospital.     Row Name 07/09/19 1213       Plan    Plan  Home    Patient/Family in Agreement with Plan  yes    Plan Comments  Per Med [employer], pt does qualify for presumptive eligibility with Medicaid and a card has been given to her. Pt would like a PCP in [place]. Called [facility] at [phone] and left vm to see if they accepted Medicaid. Pt may d/c home today. Outpatient Cardiac Rehab has been consulted. CM will cont to follow.         Discharge Codes    No documentation.       Expected Discharge Date and Time     Expected Discharge Date Expected Discharge Time    Jul 9, 2019             Leny Worthington

## 2019-07-09 NOTE — PLAN OF CARE
Problem: Patient Care Overview  Goal: Plan of Care Review  Outcome: Outcome(s) achieved Date Met: 07/09/19 07/09/19 9170   Coping/Psychosocial   Plan of Care Reviewed With patient   OTHER   Outcome Summary pt. being discharged home     Goal: Individualization and Mutuality  Outcome: Outcome(s) achieved Date Met: 07/09/19    Goal: Interprofessional Rounds/Family Conf  Outcome: Unable to achieve outcome(s) by discharge Date Met: 07/09/19      Problem: Cardiac: ACS (Acute Coronary Syndrome) (Adult)  Goal: Signs and Symptoms of Listed Potential Problems Will be Absent, Minimized or Managed (Cardiac: ACS)  Outcome: Outcome(s) achieved Date Met: 07/09/19

## 2019-07-09 NOTE — PROGRESS NOTES
[facility] at [facility]  Cardiology Progress Note      Chief Complaint/Reason for service:    · Lateral STEMI         Patient reports no further chest pain symptoms.  In retrospect, she states that she was having similar chest pains in the past that she did not realize were related to her heart.    Past medical, surgical, social and family history reviewed in the patient's electronic medical record.           Vital Sign Min/Max for last 24 hours  Temp  Min: 98.1 °F (36.7 °C)  Max: 99.4 °F (37.4 °C)   BP  Min: 92/72  Max: 156/119   Pulse  Min: 69  Max: 90   Resp  Min: 16  Max: 18   SpO2  Min: 99 %  Max: 99 %   No Data Recorded      Intake/Output Summary (Last 24 hours) at 7/9/2019 0823  Last data filed at 7/8/2019 0900  Gross per 24 hour   Intake 200 ml   Output 650 ml   Net -450 ml           Physical Exam   Constitutional: She is oriented to person, place, and time. She appears well-developed and well-nourished.   Cardiovascular: Normal rate and regular rhythm.   No murmur heard.  Pulmonary/Chest: Effort normal.   Abdominal: Soft.   Neurological: She is alert and oriented to person, place, and time.   Skin: Skin is warm.   Psychiatric: She has a normal mood and affect. Her behavior is normal.       Tele:  Sinus at 74 bpm    Results Review (reviewed the patient's recent labs in the electronic medical record):         Results from last 7 days   Lab Units 07/08/19  0738   SODIUM mmol/L 131*   POTASSIUM mmol/L 4.0   CHLORIDE mmol/L 95*   BUN mg/dL 13   CREATININE mg/dL 1.04*   MAGNESIUM mg/dL 1.8     Results from last 7 days   Lab Units 07/08/19  0738   TROPONIN T ng/mL 7.100*     Results from last 7 days   Lab Units 07/08/19  0738   WBC 10*3/mm3 11.55*   HEMOGLOBIN g/dL 15.1   HEMATOCRIT % 47.2*   PLATELETS 10*3/mm3 225       Lab Results   Component Value Date    HGBA1C 5.70 (H) 07/08/2019       Lab Results   Component Value Date    CHOL 165 07/08/2019    TRIG 57 07/08/2019    HDL 49 07/08/2019      (H) 07/08/2019     (H) 07/08/2019    ALT 31 07/08/2019                    Active Hospital Problems    Diagnosis POA   • **ST elevation myocardial infarction involving left circumflex coronary artery (CMS/HCC) [I21.21] Yes     Priority: High     · Cardiac catheterization for inferior lateral STEMI (7/8/2019): Severe 3-vessel CAD (LCx, LAD, and RPDA).  Culprit for STEMI was 100% occlusion of mid LCx status post Xience GERTRUDE.  Severe proximal LAD stenosis status post Xience GERTRUDE.     • Hyperlipidemia LDL goal <70 [E78.5] Yes   • Tobacco abuse [Z72.0] Yes   • Essential hypertension [I10] Yes     Patient is presently doing well without symptoms.  Blood pressure is a little low this morning.  Will scale back lisinopril dose and have her get up and ambulate in the hallways.  If feeling well this afternoon and pressure stable, may be discharged home.         · Reduce lisinopril dose to 5 mg daily  · Ambulate hallways  · If BP stable and ambulating without difficulty, DC home this afternoon  · Follow-up with me in 4 weeks    Vimal Newman IV, MD  7/9/2019

## 2019-07-09 NOTE — PROGRESS NOTES
Continued Stay Note  [facility]     Patient Name: Rosario Marroquin  MRN: [id number]  Today's Date: 7/9/2019    Admit Date: 7/8/2019    Discharge Plan     Row Name 07/09/19 1213       Plan    Plan  Home    Patient/Family in Agreement with Plan  yes    Plan Comments  Per [employer], pt does qualify for presumptive eligibility with Medicaid and a card has been given to her. Pt would like a PCP in [place]. Called [facility] at [phone] and left vm to see if they accepted Medicaid. Pt may d/c home today. Outpatient Cardiac Rehab has been consulted. CM will cont to follow.         Discharge Codes    No documentation.       Expected Discharge Date and Time     Expected Discharge Date Expected Discharge Time    Jul 11, 2019             Leny Worthington

## 2019-07-09 NOTE — PLAN OF CARE
Problem: Patient Care Overview  Goal: Plan of Care Review  Outcome: Ongoing (interventions implemented as appropriate)   07/09/19 0513   Coping/Psychosocial   Plan of Care Reviewed With patient   Plan of Care Review   Progress improving   OTHER   Outcome Summary VSS. Pt denies pain. Radial site clean, dry, and intact. Will continue to monitor.        Problem: Cardiac: ACS (Acute Coronary Syndrome) (Adult)  Goal: Signs and Symptoms of Listed Potential Problems Will be Absent, Minimized or Managed (Cardiac: ACS)  Outcome: Ongoing (interventions implemented as appropriate)   07/09/19 0513   Goal/Outcome Evaluation   Problems Assessed (Acute Coronary Syndrome) all   Problems Present (Acute Coronary Syn) none

## 2019-07-10 ENCOUNTER — READMISSION MANAGEMENT (OUTPATIENT)
Dept: CALL CENTER | Facility: HOSPITAL | Age: 54
End: 2019-07-10

## 2019-07-10 RX ORDER — ASPIRIN 81 MG/1
81 TABLET ORAL DAILY
Qty: 90 TABLET | Refills: 3 | Status: SHIPPED | OUTPATIENT
Start: 2019-07-10

## 2019-07-10 RX ORDER — CLOPIDOGREL BISULFATE 75 MG/1
75 TABLET ORAL DAILY
Qty: 90 TABLET | Refills: 3 | Status: SHIPPED | OUTPATIENT
Start: 2019-07-10 | End: 2020-05-29

## 2019-07-10 RX ORDER — NITROGLYCERIN 0.4 MG/1
TABLET SUBLINGUAL
Qty: 25 TABLET | Refills: 11 | Status: SHIPPED | OUTPATIENT
Start: 2019-07-10

## 2019-07-10 NOTE — OUTREACH NOTE
Prep Survey      Responses   Facility patient discharged from?  [facility]   Is patient eligible?  Yes   Discharge diagnosis  STEMI involving Left CFX, s/p heart cath with DESx2   Does the patient have one of the following disease processes/diagnoses(primary or secondary)?  Acute MI (STEMI,NSTEMI) [LACE <7]   Does the patient have Home health ordered?  No   Is there a DME ordered?  No   Comments regarding appointments  See AVS   Prep survey completed?  Yes          Mary Miller RN

## 2019-07-11 ENCOUNTER — READMISSION MANAGEMENT (OUTPATIENT)
Dept: CALL CENTER | Facility: HOSPITAL | Age: 54
End: 2019-07-11

## 2019-07-11 NOTE — OUTREACH NOTE
AMI Week 1 Survey      Responses   Facility patient discharged from?  [facility]   Does the patient have one of the following disease processes/diagnoses(primary or secondary)?  Acute MI (STEMI,NSTEMI)   Is there a successful TCM telephone encounter documented?  No   Week 1 attempt successful?  Yes   Call start time  0947   Call end time  1001   Discharge diagnosis  STEMI involving Left CFX, s/p heart cath with DESx2   Meds reviewed with patient/caregiver?  Yes   Is the patient having any side effects they believe may be caused by any medication additions or changes?  No   Does the patient have all prescriptions related to this admission filled (includes statins,anticoagulants,HTN meds,anti-arrhythmia meds)  Yes   Is the patient taking all medications as directed (includes completed medication regime)?  Yes   Does the patient have a primary care provider?   No   PCP Nursing Intervention  Assisted patient with PCP selection   Does the patient have an appointment with their PCP,cardiologist,or clinic within 7 days of discharge?  Yes   Has the patient kept scheduled appointments due by today?  N/A   Psychosocial issues?  No   Did the patient receive a copy of their discharge instructions?  Yes   Nursing interventions  Reviewed instructions with patient   What is the patient's perception of their health status since discharge?  Improving   Nursing interventions  Nurse provided patient education   Is the patient/caregiver able to teach back signs and symptoms of when to call for help immediately:  Irregular or rapid heart rate, Shortness of breath at any time, Sudden chest discomfort, Nausea or vomiting, Sudden discomfort in arms, back, neck or jaw, Sudden sweating or clammy skin, Dizziness or lightheadedness   Nursing interventions  Nurse provided patient education   Is the pateint /caregiver able to teach back the importance of cardiac rehab?  Yes   Nursing interventions  Provided education on importance of cardiac  rehab   Is the patient/caregiver able to teach back lifestyle changes to help prevent MIs  Heart healthy diet   Is the patient/caregiver able to teach back ways to prevent a second heart attack:  Take medications, Follow up with MD, Participate in Cardiac Rehab, Manage risk factors   Is the patient/caregiver able to teach back the hierarchy of who to call/visit for symptoms/problems? PCP, Specialist, Home health nurse, Urgent Care, ED, 911  Yes   Additional teach back comments  She is taking it easy.  She says the cath site is healing without bruise or knot.  Discussed BP parameters.   Week 1 call completed?  Yes          Itzel Condon RN

## 2019-07-12 NOTE — PAYOR COMM NOTE
"Christina Riley RN  Utilization Review  P: [phone]  F: [phone]    Member ID = [id number]  Clinicals for inpatient auth  Admitted 7/8 - 7/9/19 for STEMI  Facility notified of [facility] insurance on 7/12/19    Mickey Pepper (54 y.o. Female)     Date of Birth Social Security Number Address Home Phone MRN    1965  [address]  [address] [place] [id number]  [id number]    [employer] Marital Status          [facility] Single       Admission Date Admission Type Admitting Provider Attending Provider Department, Room/Bed    7/8/19 Emergency Vimal Newman IV, MD  [facility] 6A, N602/1    Discharge Date Discharge Disposition Discharge Destination        7/9/2019 Home or Self Care              Attending Provider:  (none)   Allergies:  Keflex [Cephalexin]    Isolation:  None   Infection:  None   Code Status:  Prior    Ht:  167.6 cm (66\")   Wt:  83.9 kg (185 lb)    Admission Cmt:  None   Principal Problem:  ST elevation myocardial infarction involving left circumflex coronary artery (CMS/HCC) [I21.21] More...                 Active Insurance as of 7/8/2019     Primary Coverage     Payor Plan Insurance Group Employer/Plan Group    [facility] [employer] [facility]      Payor Plan Address Payor Plan Phone Number Payor Plan Fax Number Effective Dates    [address] [address]   7/8/2019 - None Entered    [place] [address]       Subscriber Name Subscriber Birth Date Member ID       MICKEY PEPPER 1965 [id number]                 Emergency Contacts      (Rel.) Home Phone Work Phone Mobile Phone    Yanely Sears (Other) -- -- [phone]               History & Physical      Vimal Newman IV, MD at 7/8/2019  7:24 AM          [facility]  Cardiology H&P note      Chief Complaint/Reason for service:    · Inferolateral STEMI         The patient is a 54-year-old female with no prior cardiac history but risk " factors including hypertension and 1 pack a day smoking.  Yesterday, the patient developed substernal chest discomfort at around noon.  The pain persisted throughout the day.  The patient took her blood pressure medications thinking that it may help.  However, this morning the pain persisted despite taking extra blood pressure pills and so she called EMS.  On arrival, inferolateral ST elevation was noted on EKG and code AMI was activated.  On arrival to [facility] ER, STEMI was confirmed with a twelve-lead EKG and she was loaded with aspirin and clopidogrel.    On arrival to the Cath Lab, the patient complained of 6/10 chest pain.  Cardiac catheterization was performed via the left radial approach.  She was found to have 100% occlusion of the mid circumflex which was treated with a drug-eluting stent.  She was also noted to have a high-grade stenosis of the proximal LAD and this was also treated using a Xience drug-eluting stent.  No left ventriculography was performed.    Past medical, surgical, social and family history reviewed in the patient's electronic medical record.    Review of Systems:   All systems were reviewed and negative except for:  Musculoskeletal: positive for  Muscle spasms and as noted above       Vital Sign Min/Max for last 24 hours  No Data Recorded   BP  Min: 113/97  Max: 147/91   Pulse  Min: 82  Max: 86   Resp  Min: 16  Max: 18   SpO2  Min: 97 %  Max: 99 %   Flow (L/min)  Min: 15  Max: 15    No intake or output data in the 24 hours ending 07/08/19 0735        Physical Exam   Constitutional: She appears well-developed.   HENT:   Head: Normocephalic and atraumatic.   Eyes: Conjunctivae are normal.   Neck: Neck supple. No JVD present. No thyromegaly present.   Cardiovascular: Normal rate and regular rhythm. Exam reveals no gallop.   No murmur heard.  Pulses:       Carotid pulses are 2+ on the right side, and 2+ on the left side.  Pulmonary/Chest: Effort normal and breath sounds normal.   Abdominal:  Soft. There is no tenderness.   Musculoskeletal: She exhibits no edema.   Skin: Skin is warm and dry.   Psychiatric: She has a normal mood and affect. Her behavior is normal.       Tele: Sinus    Results Review (reviewed the patient's recent labs in the electronic medical record):     EKG: Sinus rhythm.  ST elevation noted in leads II, III and aVF             Active Hospital Problems    Diagnosis POA   • **ST elevation myocardial infarction involving left circumflex coronary artery (CMS/HCC) [I21.21] Yes     Priority: High     · Cardiac catheterization for inferior lateral STEMI (7/8/2019): Severe 3-vessel CAD (LCx, LAD, and RPDA).  Culprit for STEMI was 100% occlusion of mid LCx status post Xience GERTRUDE.  Severe proximal LAD stenosis status post Xience GERTRUDE.     • Hyperlipidemia LDL goal <70 [E78.5] Yes   • Tobacco abuse [Z72.0] Yes   • Essential hypertension [I10] Yes              · Admit to telemetry  · DAPT until 7/2020  · Beta-blocker and ACE inhibitor therapy  · Echo today to evaluate LVEF  · Smoking cessation  · Probable discharge home tomorrow    Vimal Newman IV, MD  7/8/2019      Electronically signed by Vimal Newman IV, MD at 7/8/2019  7:36 AM       ICU Vital Signs     Row Name 07/09/19 1238 07/09/19 1121 07/09/19 1010 07/09/19 0827 07/09/19 0704       Vitals    Temp  --  98.4 °F (36.9 °C)  --  --  98.1 °F (36.7 °C)    Temp src  --  Oral  --  --  Oral    Heart Rate Source  --  Monitor  --  --  Monitor    Resp  --  18  --  --  16    Resp Rate Source  --  Visual  --  --  Visual    BP  --  111/70  --  --  98/57    BP Location  --  Right arm  --  --  Right arm    BP Method  --  Automatic  --  --  Automatic    Patient Position  --  Lying  --  --  Lying       Oxygen Therapy    SpO2  --  100 %  --  --  99 %    Device (Oxygen Therapy)  room air  room air  room air  room air  room air    Row Name 07/09/19 0650 07/09/19 0328 07/09/19 0052 07/09/19 0004 07/08/19 2011       Vitals    Temp  --   "98.7 °F (37.1 °C)  --  --  --    Temp src  --  Oral  --  --  --    Pulse  --  69  --  73  90    Heart Rate Source  --  Monitor  --  Monitor  --    Resp  --  16  --  16  --    Resp Rate Source  --  Visual  --  Visual  --    BP  --  92/72  --  111/76  111/65    Noninvasive MAP (mmHg)  --  79  --  80  --    BP Location  --  Right arm  --  Right arm  --    BP Method  --  Automatic  --  Automatic  --    Patient Position  --  Lying  --  Lying  --       Oxygen Therapy    Device (Oxygen Therapy)  room air  --  room air  --  nasal cannula    Row Name 07/08/19 1913 07/08/19 1825 07/08/19 1620 07/08/19 1500 07/08/19 1433       Vitals    Temp  99.2 °F (37.3 °C)  --  --  99.4 °F (37.4 °C)  --    Temp src  Oral  --  --  Oral  --    Pulse  86  --  --  81  --    Heart Rate Source  Monitor  --  --  Monitor  --    Resp  18  --  --  16  --    Resp Rate Source  Visual  --  --  Visual  --    BP  99/81  --  --  133/87  --    Noninvasive MAP (mmHg)  91  --  --  --  --    BP Location  Right arm  --  --  Right arm  --    BP Method  Automatic  --  --  Automatic  --    Patient Position  Lying  --  --  Lying  --       Oxygen Therapy    Device (Oxygen Therapy)  --  room air  room air  --  room air    Row Name 07/08/19 1242 07/08/19 0955 07/08/19 0900 07/08/19 0725 07/08/19 06:50:32       Vitals    Temp  --  --  98.2 °F (36.8 °C)  --  --    Temp src  --  --  Oral  --  --    Pulse  --  --  74  --  86    Heart Rate Source  --  --  Monitor  --  --    Resp  --  --  16  --  16    Resp Rate Source  --  --  Visual  --  --    BP  --  --  156/119  (Abnormal)   --  124/89    BP Location  --  --  Right arm  --  --    BP Method  --  --  Automatic  --  --    Patient Position  --  --  Lying  --  --       Oxygen Therapy    SpO2  --  --  --  --  97 %    Device (Oxygen Therapy)  room air  room air  --  room air  --    Row Name 07/08/19 05:34:56 07/08/19 05:27:22 07/08/19 0523             Height and Weight    Height  --  --  167.6 cm (66\")      Weight  --  --  " 83.9 kg (185 lb)      Weight Method  --  --  Stated      Ideal Body Weight (IBW) (kg)  --  --  59.58      BSA (Calculated - sq m)  --  --  1.93 sq meters      BMI (Calculated)  --  --  29.9      Weight in (lb) to have BMI = 25  --  --  154.6         Vitals    Pulse  82  86  --      Resp  16  18  --      BP  147/91  113/97  --         Oxygen Therapy    SpO2  --  99 %  --      Flow (L/min)  --  15  --          Hospital Medications (all)       Dose Frequency Start End    clopidogrel (PLAVIX) tablet  Code / Trauma / Sedation Medication 7/8/2019 7/8/2019    Sig - Route: Take  by mouth Emergency Use. - Oral    heparin (porcine) 5000 UNIT/ML injection  Code / Trauma / Sedation Medication 7/8/2019 7/8/2019    Sig - Route: Infuse  into a venous catheter Emergency Use. - Intravenous    sodium chloride 0.9 % infusion 1.5 mL/kg/hr × 83.9 kg Continuous 7/8/2019 7/8/2019    Sig - Route: Infuse 125.85 mL/hr into a venous catheter Continuous. - Intravenous    Sulfur Hexafluoride Microsph 60.7-25 MG reconstituted suspension 2 mL 2 mL Once in Imaging 7/8/2019 7/8/2019    Sig - Route: Infuse 2 mL into a venous catheter Once. - Intravenous    acetaminophen (TYLENOL) tablet 650 mg (Discontinued) 650 mg Every 4 Hours PRN 7/8/2019 7/9/2019    Sig - Route: Take 2 tablets by mouth Every 4 (Four) Hours As Needed for Mild Pain . - Oral    Reason for Discontinue: Patient Discharge    aspirin chewable tablet 324 mg (Discontinued) 324 mg Once 7/8/2019 7/9/2019    Sig - Route: Chew 4 tablets 1 (One) Time. - Oral    Reason for Discontinue: Patient Discharge    Cosign for Ordering: Accepted by Vimal Newman IV, MD on 7/8/2019  6:50 AM    aspirin EC tablet 81 mg (Discontinued) 81 mg Daily 7/9/2019 7/9/2019    Sig - Route: Take 1 tablet by mouth Daily. - Oral    Reason for Discontinue: Patient Discharge    atorvastatin (LIPITOR) tablet 80 mg (Discontinued) 80 mg Nightly 7/8/2019 7/9/2019    Sig - Route: Take 2 tablets by mouth Every  Night. - Oral    Reason for Discontinue: Patient Discharge    citalopram (CeleXA) tablet 40 mg (Discontinued) 40 mg Daily 7/8/2019 7/9/2019    Sig - Route: Take 1 tablet by mouth Daily. - Oral    Reason for Discontinue: Patient Discharge    clopidogrel (PLAVIX) tablet 75 mg (Discontinued) 75 mg Daily 7/8/2019 7/8/2019    Sig - Route: Take 1 tablet by mouth Daily. - Oral    clopidogrel (PLAVIX) tablet 75 mg (Discontinued) 75 mg Daily 7/9/2019 7/9/2019    Sig - Route: Take 1 tablet by mouth Daily. - Oral    Reason for Discontinue: Patient Discharge    fentaNYL citrate (PF) (SUBLIMAZE) injection (Discontinued)  As Needed 7/8/2019 7/8/2019    Sig: As Needed.    Reason for Discontinue: Patient Discharge    heparin (porcine) injection (Discontinued)  As Needed 7/8/2019 7/8/2019    Sig: As Needed.    Reason for Discontinue: Patient Discharge    HYDROcodone-acetaminophen (NORCO) 7.5-325 MG per tablet 1 tablet (Discontinued) 1 tablet Every 4 Hours PRN 7/8/2019 7/9/2019    Sig - Route: Take 1 tablet by mouth Every 4 (Four) Hours As Needed for Moderate Pain . - Oral    Reason for Discontinue: Patient Discharge    iopamidol (ISOVUE-370) 76 % injection (Discontinued)  As Needed 7/8/2019 7/8/2019    Sig: As Needed.    Reason for Discontinue: Patient Discharge    lidocaine PF 1% (XYLOCAINE) injection (Discontinued)  As Needed 7/8/2019 7/8/2019    Sig: As Needed.    Reason for Discontinue: Patient Discharge    lisinopril (PRINIVIL,ZESTRIL) tablet 10 mg (Discontinued) 10 mg Daily 7/10/2019 7/9/2019    Sig - Route: Take 1 tablet by mouth Daily. - Oral    lisinopril (PRINIVIL,ZESTRIL) tablet 20 mg (Discontinued) 20 mg Daily 7/8/2019 7/9/2019    Sig - Route: Take 1 tablet by mouth Daily. - Oral    lisinopril (PRINIVIL,ZESTRIL) tablet 5 mg (Discontinued) 5 mg Daily 7/9/2019 7/9/2019    Sig - Route: Take 1 tablet by mouth Daily. - Oral    lisinopril (PRINIVIL,ZESTRIL) tablet 5 mg (Discontinued) 5 mg Daily 7/10/2019 7/9/2019    Sig -  "Route: Take 1 tablet by mouth Daily. - Oral    Reason for Discontinue: Patient Discharge    LORazepam (ATIVAN) tablet 1 mg (Discontinued) 1 mg Every 6 Hours PRN 7/8/2019 7/9/2019    Sig - Route: Take 1 tablet by mouth Every 6 (Six) Hours As Needed for Anxiety. - Oral    Reason for Discontinue: Patient Discharge    Magnesium Sulfate 2 gram / 50mL Infusion (GIVE X 3 BAGS TO EQUAL 6GM TOTAL DOSE) - Mg 1.1 - 1.5 mg/dl (Discontinued) 2 g As Needed 7/8/2019 7/9/2019    Sig - Route: Infuse 50 mL into a venous catheter As Needed (See Administration Instructions). - Intravenous    Reason for Discontinue: Patient Discharge    Linked Group 1:  \"Or\" Linked Group Details        Magnesium Sulfate 2 gram Bolus, followed by 8 gram infusion (total Mg dose 10 grams)- Mg less than or equal to 1mg/dL (Discontinued) 2 g As Needed 7/8/2019 7/9/2019    Sig - Route: Infuse 50 mL into a venous catheter As Needed (See Administration Instructions). - Intravenous    Reason for Discontinue: Patient Discharge    Linked Group 1:  \"Or\" Linked Group Details        Magnesium Sulfate 4 gram infusion- Mg 1.6-1.9 mg/dL (Discontinued) 4 g As Needed 7/8/2019 7/9/2019    Sig - Route: Infuse 100 mL into a venous catheter As Needed (See Administration Instructions). - Intravenous    Reason for Discontinue: Patient Discharge    Linked Group 1:  \"Or\" Linked Group Details        metoprolol tartrate (LOPRESSOR) tablet 25 mg (Discontinued) 25 mg Every 12 Hours Scheduled 7/8/2019 7/9/2019    Sig - Route: Take 1 tablet by mouth Every 12 (Twelve) Hours. - Oral    Reason for Discontinue: Patient Discharge    midazolam (VERSED) injection (Discontinued)  As Needed 7/8/2019 7/8/2019    Sig: As Needed.    Reason for Discontinue: Patient Discharge    nicardipine (CARDENE) 100 MCG/ mcg, nitroglycerin 400 mcg, heparin (porcine) 5,000 Units radial artery injection (Discontinued)  As Needed 7/8/2019 7/8/2019    Sig: As Needed.    Reason for Discontinue: Patient " "Discharge    nitroglycerin 100 mcg/mL in D5W syringe (Discontinued)  As Needed 7/8/2019 7/8/2019    Sig: As Needed.    Reason for Discontinue: Patient Discharge    nitroglycerin 100 mcg/mL in D5W syringe (Discontinued)  As Needed 7/8/2019 7/8/2019    Sig: As Needed.    Reason for Discontinue: Patient Discharge    O2 (OXYGEN) (Discontinued)  As Needed 7/8/2019 7/8/2019    Sig: As Needed.    Reason for Discontinue: Patient Discharge    ondansetron (ZOFRAN) injection 4 mg (Discontinued) 4 mg Every 6 Hours PRN 7/8/2019 7/9/2019    Sig - Route: Infuse 2 mL into a venous catheter Every 6 (Six) Hours As Needed for Nausea or Vomiting. - Intravenous    Reason for Discontinue: Patient Discharge    Linked Group 2:  \"Or\" Linked Group Details        ondansetron (ZOFRAN) tablet 4 mg (Discontinued) 4 mg Every 6 Hours PRN 7/8/2019 7/9/2019    Sig - Route: Take 1 tablet by mouth Every 6 (Six) Hours As Needed for Nausea or Vomiting. - Oral    Reason for Discontinue: Patient Discharge    Linked Group 2:  \"Or\" Linked Group Details        Pharmacy Consult - MTM (Discontinued)  Daily 7/8/2019 7/9/2019    Sig - Route: Daily. - Does not apply    Reason for Discontinue: Patient Discharge    Pharmacy Meds to Bed Consult (Discontinued)  Daily 7/8/2019 7/8/2019    Sig - Route: Daily. - Does not apply    Pharmacy Meds to Bed Consult (Discontinued)  Daily 7/9/2019 7/9/2019    Sig - Route: Daily. - Does not apply    Reason for Discontinue: Patient Discharge    potassium chloride (KLOR-CON) packet 40 mEq (Discontinued) 40 mEq As Needed 7/8/2019 7/9/2019    Sig - Route: Take 40 mEq by mouth As Needed (Potassium Replacement, See Admin Instructions). - Oral    Reason for Discontinue: Patient Discharge    potassium chloride (MICRO-K) CR capsule 40 mEq (Discontinued) 40 mEq As Needed 7/8/2019 7/9/2019    Sig - Route: Take 4 capsules by mouth As Needed (Potassium Replacement.  See Admin Instructions). - Oral    Reason for Discontinue: Patient Discharge "    prasugrel (EFFIENT) tablet (Discontinued)  As Needed 7/8/2019 7/8/2019    Sig: As Needed.    Reason for Discontinue: Patient Discharge    sodium chloride 0.9 % flush 10 mL (Discontinued) 10 mL As Needed 7/8/2019 7/9/2019    Sig - Route: Infuse 10 mL into a venous catheter As Needed for Line Care. - Intravenous    Reason for Discontinue: Patient Discharge    Cosign for Ordering: Accepted by Vimal Newman IV, MD on 7/8/2019  6:50 AM    sodium chloride 0.9 % flush 3 mL (Discontinued) 3 mL Every 12 Hours Scheduled 7/8/2019 7/9/2019    Sig - Route: Infuse 3 mL into a venous catheter Every 12 (Twelve) Hours. - Intravenous    Reason for Discontinue: Patient Discharge    sodium chloride 0.9 % flush 3-10 mL (Discontinued) 3-10 mL As Needed 7/8/2019 7/9/2019    Sig - Route: Infuse 3-10 mL into a venous catheter As Needed for Line Care. - Intravenous    Reason for Discontinue: Patient Discharge          Lab Results (all)     Procedure Component Value Units Date/Time    Basic Metabolic Panel [[id number]]  (Abnormal) Collected:  07/09/19 0921    Specimen:  Blood Updated:  07/09/19 1014     Glucose 199 mg/dL      BUN 14 mg/dL      Creatinine 1.04 mg/dL      Sodium 135 mmol/L      Potassium 3.9 mmol/L      Chloride 97 mmol/L      CO2 23.0 mmol/L      Calcium 9.4 mg/dL      eGFR Non African Amer 55 mL/min/1.73      BUN/Creatinine Ratio 13.5     Anion Gap 15.0 mmol/L     Narrative:       GFR Normal >60  Chronic Kidney Disease <60  Kidney Failure <15    CBC (No Diff) [[id number]]  (Abnormal) Collected:  07/09/19 0921    Specimen:  Blood Updated:  07/09/19 0950     WBC 8.39 10*3/mm3      RBC 4.85 10*6/mm3      Hemoglobin 15.0 g/dL      Hematocrit 47.2 %      MCV 97.3 fL      MCH 30.9 pg      MCHC 31.8 g/dL      RDW 14.4 %      RDW-SD 51.6 fl      MPV 10.8 fL      Platelets 236 10*3/mm3     Hemoglobin A1c [[id number]]  (Abnormal) Collected:  07/08/19 0738    Specimen:  Blood Updated:  07/08/19 0865     Hemoglobin A1C 5.70 %      Narrative:       Hemoglobin A1C Ranges:    Increased Risk for Diabetes  5.7% to 6.4%  Diabetes                     >= 6.5%  Diabetic Goal                < 7.0%    Comprehensive Metabolic Panel [[id number]]  (Abnormal) Collected:  07/08/19 0738    Specimen:  Blood Updated:  07/08/19 0831     Glucose 134 mg/dL      BUN 13 mg/dL      Creatinine 1.04 mg/dL      Sodium 131 mmol/L      Potassium 4.0 mmol/L      Chloride 95 mmol/L      CO2 25.0 mmol/L      Calcium 9.0 mg/dL      Total Protein 6.6 g/dL      Albumin 3.90 g/dL      ALT (SGPT) 31 U/L      AST (SGOT) 115 U/L      Alkaline Phosphatase 92 U/L      Total Bilirubin 0.3 mg/dL      eGFR Non African Amer 55 mL/min/1.73      Globulin 2.7 gm/dL      A/G Ratio 1.4 g/dL      BUN/Creatinine Ratio 12.5     Anion Gap 11.0 mmol/L     Narrative:       GFR Normal >60  Chronic Kidney Disease <60  Kidney Failure <15    Lipase [[id number]]  (Normal) Collected:  07/08/19 0738    Specimen:  Blood Updated:  07/08/19 0831     Lipase 35 U/L     Lipid Panel [[id number]]  (Abnormal) Collected:  07/08/19 0738    Specimen:  Blood Updated:  07/08/19 0831     Total Cholesterol 165 mg/dL      Triglycerides 57 mg/dL      HDL Cholesterol 49 mg/dL      LDL Cholesterol  105 mg/dL      VLDL Cholesterol 11.4 mg/dL      LDL/HDL Ratio 2.13    Narrative:       Cholesterol Reference Ranges  (U.S. Department of Health and Human Services ATP III Classifications)    Desirable          <200 mg/dL  Borderline High    200-239 mg/dL  High Risk          >240 mg/dL      Triglyceride Reference Ranges  (U.S. Department of Health and Human Services ATP III Classifications)    Normal           <150 mg/dL  Borderline High  150-199 mg/dL  High             200-499 mg/dL  Very High        >500 mg/dL    HDL Reference Ranges  (U.S. Department of Health and Human Services ATP III Classifcations)    Low     <40 mg/dl (major risk factor for CHD)  High    >60 mg/dl ('negative' risk factor for CHD)        LDL Reference  Ranges  (U.S. Department of Health and Human Services ATP III Classifcations)    Optimal          <100 mg/dL  Near Optimal     100-129 mg/dL  Borderline High  130-159 mg/dL  High             160-189 mg/dL  Very High        >189 mg/dL    Magnesium [[id number]]  (Normal) Collected:  07/08/19 0738    Specimen:  Blood Updated:  07/08/19 0831     Magnesium 1.8 mg/dL     Troponin [[id number]]  (Abnormal) Collected:  07/08/19 0738    Specimen:  Blood Updated:  07/08/19 0831     Troponin T 7.100 ng/mL     Narrative:       Troponin T Reference Range:  <= 0.03 ng/mL-   Negative for AMI  >0.03 ng/mL-     Abnormal for myocardial necrosis.  Clinicians would have to utilize clinical acumen, EKG, Troponin and serial changes to determine if it is an Acute Myocardial Infarction or myocardial injury due to an underlying chronic condition.     BNP [[id number]]  (Abnormal) Collected:  07/08/19 0738    Specimen:  Blood Updated:  07/08/19 0830     proBNP 1,508.0 pg/mL     Narrative:       Among patients with dyspnea, NT-proBNP is highly sensitive for the detection of acute congestive heart failure. In addition NT-proBNP of <300 pg/ml effectively rules out acute congestive heart failure with 99% negative predictive value.    TSH [[id number]]  (Normal) Collected:  07/08/19 0738    Specimen:  Blood Updated:  07/08/19 0830     TSH 3.330 mIU/mL     CBC & Differential [[id number]] Collected:  07/08/19 0738    Specimen:  Blood Updated:  07/08/19 0809    Narrative:       The following orders were created for panel order CBC & Differential.  Procedure                               Abnormality         Status                     ---------                               -----------         ------                     CBC Auto Differential[[id number]]        Abnormal            Final result                 Please view results for these tests on the individual orders.    CBC Auto Differential [[id number]]  (Abnormal) Collected:  07/08/19 0738    Specimen:   Blood Updated:  07/08/19 0809     WBC 11.55 10*3/mm3      RBC 4.88 10*6/mm3      Hemoglobin 15.1 g/dL      Hematocrit 47.2 %      MCV 96.7 fL      MCH 30.9 pg      MCHC 32.0 g/dL      RDW 14.0 %      RDW-SD 49.5 fl      MPV 10.5 fL      Platelets 225 10*3/mm3      Neutrophil % 83.7 %      Lymphocyte % 9.7 %      Monocyte % 5.0 %      Eosinophil % 0.3 %      Basophil % 0.7 %      Immature Grans % 0.6 %      Neutrophils, Absolute 9.67 10*3/mm3      Lymphocytes, Absolute 1.12 10*3/mm3      Monocytes, Absolute 0.58 10*3/mm3      Eosinophils, Absolute 0.03 10*3/mm3      Basophils, Absolute 0.08 10*3/mm3      Immature Grans, Absolute 0.07 10*3/mm3      nRBC 0.0 /100 WBC     POC Activated Clotting Time [[id number]]  (Abnormal) Collected:  07/08/19 0554    Specimen:  Blood Updated:  07/08/19 0723     Activated Clotting Time  444 Seconds      Comment: Serial Number: [id number]:  [id number]             Imaging Results (all)     Procedure Component Value Units Date/Time    XR Chest 1 View [[id number]] Collected:  07/08/19 0839     Updated:  07/08/19 1803    Narrative:       EXAMINATION: XR CHEST 1 VW- 07/08/2019      INDICATION: Chest Pain triage protocol; I21.3-ST elevation (STEMI)  myocardial infarction of unspecified site      COMPARISON: NONE     FINDINGS: Portable chest reveals cardiac and mediastinal silhouettes  within normal limits. Mild increased markings identified within the left  lung base. Degenerative changes seen within the spine. Pulmonary  vascularity is within normal limits. No pleural effusion or  pneumothorax.       Impression:       Mild increased markings identified at the left lung base.  The remainder of the chest is grossly unremarkable.     D:  07/08/2019  E:  07/08/2019     This report was finalized on 7/8/2019 6:00 PM by Dr. Argelia Huffman MD.             ECG/EMG Results (all)     Procedure Component Value Units Date/Time    ECG 12 Lead [[id number]] Collected:  07/08/19 0741     Updated:  07/08/19  0751    Narrative:       Test Reason : Post-cath  Blood Pressure : **/** mmHG  Vent. Rate : 077 BPM     Atrial Rate : 077 BPM     P-R Int : 142 ms          QRS Dur : 078 ms      QT Int : 404 ms       P-R-T Axes : 058 035 051 degrees     QTc Int : 457 ms    Normal sinus rhythm  Possible Left atrial enlargement  Low voltage QRS  Inferior infarct (cited on or before 08-JUL-2019)  Cannot rule out Anterior infarct , age undetermined  Abnormal ECG  When compared with ECG of 08-JUL-2019 05:24, (Unconfirmed)  No significant change was found    Referred By:  IMRIAM           Confirmed By:     Adult Transthoracic Echo Complete W/ Cont if Necessary Per Protocol [[id number]] Collected:  07/08/19 1013     Updated:  07/08/19 1517     BSA 1.9 m^2       CV ECHO JUSTEN - RVDD 2.5 cm      IVSd 0.87 cm      LVIDd 4.4 cm      LVIDs 2.7 cm      LVPWd 1.3 cm      IVS/LVPW 0.68     FS 40.1 %      EDV(Teich) 90.0 ml      ESV(Teich) 26.1 ml      EF(Teich) 71.0 %      EDV(cubed) 88.1 ml      ESV(cubed) 18.9 ml      EF(cubed) 78.5 %      LV mass(C)d 165.8 grams      LV mass(C)dI 85.7 grams/m^2      SV(Teich) 63.9 ml      SI(Teich) 33.0 ml/m^2      SV(cubed) 69.2 ml      SI(cubed) 35.8 ml/m^2      Ao root diam 2.9 cm      Ao root area 6.5 cm^2      LA dimension 3.7 cm      asc Aorta Diam 2.7 cm      LA/Ao 1.3     LVOT diam 2.0 cm      LVOT area 3.1 cm^2      LVOT area(traced) 3.1 cm^2      LAd major 5.0 cm      LVLd ap4 8.0 cm      EDV(MOD-sp4) 66.0 ml      LVLs ap4 7.0 cm      ESV(MOD-sp4) 32.0 ml      EF(MOD-sp4) 51.5 %      LVLd ap2 7.1 cm      EDV(MOD-sp2) 66.0 ml      LVLs ap2 6.5 cm      ESV(MOD-sp2) 31.0 ml      EF(MOD-sp2) 53.0 %      EF(MOD-bp) 54.0 %      SV(MOD-sp4) 34.0 ml      SI(MOD-sp4) 17.6 ml/m^2      SV(MOD-sp2) 35.0 ml      SI(MOD-sp2) 18.1 ml/m^2      Ao root area (BSA corrected) 1.5     LV [place] Vol (BSA corrected) 34.1 ml/m^2      LV Sys Vol (BSA corrected) 16.5 ml/m^2      MV E max alice 101.2 cm/sec      MV A max alice  125.4 cm/sec      MV E/A 0.81     MV dec time 0.15 sec      Ao pk melvin 148.8 cm/sec      Ao max PG 8.9 mmHg      Ao max PG (full) 4.8 mmHg      Ao V2 mean 96.4 cm/sec      Ao mean PG 4.6 mmHg      Ao mean PG (full) 2.2 mmHg      Ao V2 VTI 39.4 cm      RUPERTO(I,A) 2.1 cm^2      RUPERTO(I,D) 2.1 cm^2      RUPERTO(V,A) 2.1 cm^2      RUPERTO(V,D) 2.1 cm^2      LV V1 max PG 4.0 mmHg      LV V1 mean PG 2.4 mmHg      LV V1 max 100.2 cm/sec      LV V1 mean 72.4 cm/sec      LV V1 VTI 27.0 cm      SV(Ao) 257.5 ml      SI(Ao) 133.1 ml/m^2      SV(LVOT) 84.6 ml      SI(LVOT) 43.7 ml/m^2      TV V2 max 10.4 cm/sec      TV max PG 0.04 mmHg      PA V2 max 90.9 cm/sec      PA max PG 3.3 mmHg      PA acc slope 395.2 cm/sec^2      PA acc time 0.15 sec      PA pr(Accel) 9.3 mmHg      Lat E/e'  15.9     Med E/e' 16.4     Lat Peak E' Melvin 6.3 cm/sec      Med Peak E' Melvin 6.0 cm/sec       CV ECHO JUSTEN - BZI_BMI 29.9 kilograms/m^2       CV ECHO JUSTEN - BSA([facility]) 2.0 m^2       CV ECHO JUSTEN - BZI_METRIC_WEIGHT 83.9 kg       CV ECHO JUSTEN - BZI_METRIC_HEIGHT 167.6 cm      Avg E/e' ratio 16.46      CV VAS BP RIGHT /119 mmHg      TDI S' 10.30 cm/sec      RV Base 3.40 cm      RV Length 7.40 cm      RV Mid 3.20 cm      TAPSE (>1.6) 2.40 cm2      Echo EF Estimated 60 %     Narrative:       · Left ventricular systolic function is normal. Estimated EF = 60%. The   following left ventricular wall segments are hypokinetic: basal   inferolateral and mid inferolateral.  · The cardiac valves are anatomically and functionally normal.       ECG 12 Lead [[id number]] Collected:  07/08/19 0524     Updated:  07/11/19 2152    Narrative:       Test Reason : chest pain  Blood Pressure : **/** mmHG  Vent. Rate : 086 BPM     Atrial Rate : 086 BPM     P-R Int : 132 ms          QRS Dur : 080 ms      QT Int : 408 ms       P-R-T Axes : 050 034 070 degrees     QTc Int : 488 ms    ** Age and gender specific ECG analysis **  Normal sinus rhythm  Inferior infarct ,  possibly acute  ** ** ACUTE MI ** **  Abnormal ECG  No previous ECGs available  Confirmed by MD FOURNIER CORY ([id number]) on 7/11/2019 9:52:04 PM    Referred By:  EDMD           Confirmed By:CY FOURNIER MD          Operative/Procedure Notes (all)     No notes of this type exist for this encounter.           Physician Progress Notes (all)      Vimal Newman IV, MD at 7/9/2019  8:23 AM          [facility]  Cardiology Progress Note      Chief Complaint/Reason for service:    · Lateral STEMI    Subjective     Patient reports no further chest pain symptoms.  In retrospect, she states that she was having similar chest pains in the past that she did not realize were related to her heart.    Past medical, surgical, social and family history reviewed in the patient's electronic medical record.        Objective   Vital Sign Min/Max for last 24 hours  Temp  Min: 98.1 °F (36.7 °C)  Max: 99.4 °F (37.4 °C)   BP  Min: 92/72  Max: 156/119   Pulse  Min: 69  Max: 90   Resp  Min: 16  Max: 18   SpO2  Min: 99 %  Max: 99 %   No Data Recorded      Intake/Output Summary (Last 24 hours) at 7/9/2019 0823  Last data filed at 7/8/2019 0900  Gross per 24 hour   Intake 200 ml   Output 650 ml   Net -450 ml           Physical Exam   Constitutional: She is oriented to person, place, and time. She appears well-developed and well-nourished.   Cardiovascular: Normal rate and regular rhythm.   No murmur heard.  Pulmonary/Chest: Effort normal.   Abdominal: Soft.   Neurological: She is alert and oriented to person, place, and time.   Skin: Skin is warm.   Psychiatric: She has a normal mood and affect. Her behavior is normal.       Tele:  Sinus at 74 bpm    Results Review (reviewed the patient's recent labs in the electronic medical record):         Results from last 7 days   Lab Units 07/08/19  0738   SODIUM mmol/L 131*   POTASSIUM mmol/L 4.0   CHLORIDE mmol/L 95*   BUN mg/dL 13   CREATININE mg/dL 1.04*    MAGNESIUM mg/dL 1.8     Results from last 7 days   Lab Units 07/08/19  0738   TROPONIN T ng/mL 7.100*     Results from last 7 days   Lab Units 07/08/19  0738   WBC 10*3/mm3 11.55*   HEMOGLOBIN g/dL 15.1   HEMATOCRIT % 47.2*   PLATELETS 10*3/mm3 225       Lab Results   Component Value Date    HGBA1C 5.70 (H) 07/08/2019       Lab Results   Component Value Date    CHOL 165 07/08/2019    TRIG 57 07/08/2019    HDL 49 07/08/2019     (H) 07/08/2019     (H) 07/08/2019    ALT 31 07/08/2019               Assessment     Active Hospital Problems    Diagnosis POA   • **ST elevation myocardial infarction involving left circumflex coronary artery (CMS/HCC) [I21.21] Yes     Priority: High     · Cardiac catheterization for inferior lateral STEMI (7/8/2019): Severe 3-vessel CAD (LCx, LAD, and RPDA).  Culprit for STEMI was 100% occlusion of mid LCx status post Xience GERTRUDE.  Severe proximal LAD stenosis status post Xience GERTRUDE.     • Hyperlipidemia LDL goal <70 [E78.5] Yes   • Tobacco abuse [Z72.0] Yes   • Essential hypertension [I10] Yes     Patient is presently doing well without symptoms.  Blood pressure is a little low this morning.  Will scale back lisinopril dose and have her get up and ambulate in the hallways.  If feeling well this afternoon and pressure stable, may be discharged home.    Plan     · Reduce lisinopril dose to 5 mg daily  · Ambulate hallways  · If BP stable and ambulating without difficulty, DC home this afternoon  · Follow-up with me in 4 weeks    Vimal Newman IV, MD  7/9/2019      Electronically signed by Vimal Newman IV, MD at 7/9/2019  8:28 AM     Francheska Broderick APRN at 7/8/2019  9:12 AM     Attestation signed by Vimal Newman IV, MD at 7/9/2019  8:23 AM    I have reviewed the documentation above and agree.    CHRIS Newman MD [facility], [facility]  Interventional and General Cardiology                      [facility]  [place]  Cardiology Progress Note      Chief Complaint/Reason for service:    · STEMI    Subjective     Patient seen post PCI earlier this morning after presenting as an ST elevation myocardial infarction.  Coronary angiography revealed severe three-vessel disease but the culprit of her STEMI was 100% occlusion of the mid circumflex that was treated with a drug-eluting stent.  She also received a drug-eluting stent to her proximal LAD.    Past medical, surgical, social and family history reviewed in the patient's electronic medical record.        Objective   Vital Sign Min/Max for last 24 hours  No Data Recorded   BP  Min: 113/97  Max: 147/91   Pulse  Min: 82  Max: 86   Resp  Min: 16  Max: 18   SpO2  Min: 97 %  Max: 99 %   Flow (L/min)  Min: 15  Max: 15    No intake or output data in the 24 hours ending 07/08/19 0912        Physical Exam   Constitutional: She is oriented to person, place, and time. She appears well-developed and well-nourished.   HENT:   Head: Normocephalic.   Neck: No JVD present. Carotid bruit is not present.   Cardiovascular: Normal rate, regular rhythm, normal heart sounds and intact distal pulses. Exam reveals no gallop and no friction rub.   No murmur heard.  Pulmonary/Chest: Effort normal and breath sounds normal.   Abdominal: Soft.   Musculoskeletal: She exhibits no edema.   Neurological: She is alert and oriented to person, place, and time.   Skin: Skin is warm and dry. No cyanosis. Nails show no clubbing.   Psychiatric: She has a normal mood and affect. Her behavior is normal.       Tele: Normal sinus rhythm    Results Review (reviewed the patient's recent labs in the electronic medical record):           Results from last 7 days   Lab Units 07/08/19  0738   SODIUM mmol/L 131*   POTASSIUM mmol/L 4.0   CHLORIDE mmol/L 95*   BUN mg/dL 13   CREATININE mg/dL 1.04*   MAGNESIUM mg/dL 1.8     Results from last 7 days   Lab Units 07/08/19  0738   TROPONIN T ng/mL 7.100*     Results from last 7  days   Lab Units 07/08/19  0738   WBC 10*3/mm3 11.55*   HEMOGLOBIN g/dL 15.1   HEMATOCRIT % 47.2*   PLATELETS 10*3/mm3 225       Lab Results   Component Value Date    HGBA1C 5.70 (H) 07/08/2019       Lab Results   Component Value Date    CHOL 165 07/08/2019    TRIG 57 07/08/2019    HDL 49 07/08/2019     (H) 07/08/2019         Assessment     Active Hospital Problems    Diagnosis POA   • **ST elevation myocardial infarction involving left circumflex coronary artery (CMS/HCC) [I21.21] Yes     · Cardiac catheterization for inferior lateral STEMI (7/8/2019): Severe 3-vessel CAD (LCx, LAD, and RPDA).  Culprit for STEMI was 100% occlusion of mid LCx status post Xience GERTRUDE.  Severe proximal LAD stenosis status post Xience GERTRUDE.     • Hyperlipidemia LDL goal <70 [E78.5] Yes   • Tobacco abuse [Z72.0] Yes   • Essential hypertension [I10] Yes         Plan     · Dual antiplatelet therapy for 1 year  · Echocardiogram today  · Continue lisinopril and metoprolol  · Recommend immediate smoking cessation and other lifestyle modification to reduce risk factors for future cardiovascular events    Jenni Broderick, APRN  7/8/2019    Electronically signed by Vimal Newman IV, MD at 7/9/2019  8:23 AM       Consult Notes (all)     No notes of this type exist for this encounter.        Discharge Summary     No notes of this type exist for this encounter.

## 2019-07-16 ENCOUNTER — OFFICE VISIT (OUTPATIENT)
Dept: CARDIOLOGY | Facility: HOSPITAL | Age: 54
End: 2019-07-16

## 2019-07-16 VITALS
RESPIRATION RATE: 18 BRPM | WEIGHT: 212 LBS | TEMPERATURE: 97.1 F | HEIGHT: 66 IN | DIASTOLIC BLOOD PRESSURE: 79 MMHG | SYSTOLIC BLOOD PRESSURE: 117 MMHG | HEART RATE: 74 BPM | OXYGEN SATURATION: 97 % | BODY MASS INDEX: 34.07 KG/M2

## 2019-07-16 DIAGNOSIS — I21.21 ST ELEVATION MYOCARDIAL INFARCTION INVOLVING LEFT CIRCUMFLEX CORONARY ARTERY (HCC): ICD-10-CM

## 2019-07-16 DIAGNOSIS — E78.5 HYPERLIPIDEMIA LDL GOAL <70: ICD-10-CM

## 2019-07-16 DIAGNOSIS — I25.10 CORONARY ARTERY DISEASE INVOLVING NATIVE CORONARY ARTERY OF NATIVE HEART WITHOUT ANGINA PECTORIS: ICD-10-CM

## 2019-07-16 DIAGNOSIS — Z72.0 TOBACCO ABUSE: ICD-10-CM

## 2019-07-16 PROCEDURE — 99406 BEHAV CHNG SMOKING 3-10 MIN: CPT | Performed by: NURSE PRACTITIONER

## 2019-07-16 PROCEDURE — 99214 OFFICE O/P EST MOD 30 MIN: CPT | Performed by: NURSE PRACTITIONER

## 2019-07-16 RX ORDER — CITALOPRAM 40 MG/1
40 TABLET ORAL DAILY
COMMUNITY
End: 2020-05-29 | Stop reason: ALTCHOICE

## 2019-07-16 NOTE — PROGRESS NOTES
[facility]  [facility]      Encounter Date:07/16/2019     Rosario Marroquin  [address] [place] [facility] [address]  [unfilled]    1965    Provider, No Known    Rosario Marroquin is a 54 y.o. female.      Subjective:     Chief Complaint:  Establish Care (heart attack hosp follow up)       Hypertension   This is a recurrent problem. The current episode started more than 1 year ago. The problem has been rapidly worsening since onset. The problem is controlled. Associated symptoms include blurred vision (no recent eye exam), malaise/fatigue (improved), palpitations (rare, occassional) and shortness of breath (improved. ). Pertinent negatives include no anxiety, headaches, orthopnea or PND. Agents associated with hypertension include NSAIDs. Risk factors for coronary artery disease include dyslipidemia, family history and smoking/tobacco exposure. Compliance problems include exercise and psychosocial issues.         54-year-old female admitted to [facility] on 7/8/2019 with ST elevated MI.  Heart catheterization completed 7/8/2019 with severe three-vessel CAD (left circumflex, LAD, RPDA).  Culprit for STEMI was 100% occlusion of mid left circumflex status post drug-eluting stent.  Severe proximal LAD stenosis status post drug-eluting stent.  Patient discharged on aspirin, statin, Plavix, lisinopril, metoprolol tartrate.  Lisinopril had to be decreased during hospital stay due to hypotension.  Echocardiogram completed 7/8/2019 with EF 60%, cardiac valves functionally and anatomically normal.    Pt denies CP, pressure, dyspnea, dizziness, syncope, edema.  Improve activity tolerance better.  No concerns about meds. Pt has decreased her tobacco in take 0.5 ppd.      Patient Active Problem List    Diagnosis Date Noted   • Coronary artery disease 07/16/2019   • ST elevation myocardial infarction involving left circumflex coronary artery (CMS/HCC) 07/08/2019     Note Last Updated:  2019     · Cardiac catheterization for inferior lateral STEMI (2019): Severe 3-vessel CAD (LCx, LAD, and RPDA).  Culprit for STEMI was 100% occlusion of mid LCx status post Xience GERTRUDE.  Severe proximal LAD stenosis status post Xience GERTRUDE.  · echo 19:  60%, cardiac valve anatomically and functionally normal.      • Hyperlipidemia LDL goal <70 2019   • Tobacco abuse 2019   • Essential hypertension 2019         Past Surgical History:   Procedure Laterality Date   • CARDIAC CATHETERIZATION N/A 2019    Procedure: LEFT HEART CATH;  Surgeon: Vimal Newman IV, MD;  Location:  [facility];  Service: Cardiovascular   • CARDIAC CATHETERIZATION N/A 2019    Procedure: Optical Coherent Tomography;  Surgeon: Vimal Newman IV, MD;  Location:  [facility];  Service: Cardiovascular   • CARDIAC CATHETERIZATION N/A 2019    Procedure: Stent GERTRUDE coronary;  Surgeon: Vimal Newman IV, MD;  Location:  [facility];  Service: Cardiovascular   • CARDIAC CATHETERIZATION  2019    Procedure: Percutaneous Manual Thrombectomy;  Surgeon: Vimal Newman IV, MD;  Location:  [facility];  Service: Cardiovascular   •  SECTION     • CHOLECYSTECTOMY     • HERNIA REPAIR      mesh       Allergies   Allergen Reactions   • Keflex [Cephalexin] Swelling     Unknown           Current Outpatient Medications:   •  aspirin 81 MG EC tablet, Take 1 tablet by mouth Daily., Disp: 90 tablet, Rfl: 3  •  atorvastatin (LIPITOR) 80 MG tablet, Take 1 tablet by mouth Every Night., Disp: 90 tablet, Rfl: 0  •  citalopram (CeleXA) 40 MG tablet, Take 40 mg by mouth Daily., Disp: , Rfl:   •  clopidogrel (PLAVIX) 75 MG tablet, Take 1 tablet by mouth Daily., Disp: 90 tablet, Rfl: 3  •  gabapentin (NEURONTIN) 100 MG capsule, Take 100 mg by mouth every night at bedtime., Disp: , Rfl:   •  lisinopril  "(PRINIVIL,ZESTRIL) 5 MG tablet, Take 1 tablet by mouth Daily., Disp: 90 tablet, Rfl: 0  •  metoprolol tartrate (LOPRESSOR) 25 MG tablet, Take 1 tablet by mouth Every 12 (Twelve) Hours., Disp: 180 tablet, Rfl: 0  •  nitroglycerin (NITROSTAT) 0.4 MG SL tablet, Dissolve 1 tablet under the tongue as needed for chest pain, may repeat every 5 minutes for up three doses, Disp: 25 tablet, Rfl: 11    The following portions of the patient's history were reviewed and updated today during office visit as appropriate: allergies, current medications, past family history, past medical history, past social history, past surgical history and problem list.    Review of Systems   Constitution: Positive for malaise/fatigue (improved).   Eyes: Positive for blurred vision (no recent eye exam).   Cardiovascular: Positive for palpitations (rare, occassional). Negative for orthopnea and paroxysmal nocturnal dyspnea.   Respiratory: Positive for shortness of breath (improved. ).    Neurological: Negative for headaches.   All other systems reviewed and are negative.      Objective:     Vitals:    07/16/19 1029 07/16/19 1030 07/16/19 1031   BP: 129/79 117/77 117/79   BP Location: Left arm Right arm Right arm   Patient Position: Sitting Sitting Standing   Cuff Size: Adult Adult Adult   Pulse: 74 71 74   Resp: 18     Temp: 97.1 °F (36.2 °C)     TempSrc: Temporal     SpO2: 97% 98% 97%   Weight: 96.2 kg (212 lb)     Height: 167.6 cm (66\")           Physical Exam   Constitutional: She is oriented to person, place, and time. She appears well-developed and well-nourished. No distress.   HENT:   Mouth/Throat: Oropharynx is clear and moist.   Eyes: No scleral icterus.   Neck: No hepatojugular reflux and no JVD present. Carotid bruit is not present. No tracheal deviation present. No thyromegaly present.   Cardiovascular: Normal rate, regular rhythm, normal heart sounds and intact distal pulses. Exam reveals no friction rub.   No murmur " "heard.  Pulmonary/Chest: Effort normal and breath sounds normal.   Abdominal: Soft. Bowel sounds are normal. She exhibits no distension. There is no tenderness.   Musculoskeletal: She exhibits no edema.   Lymphadenopathy:     She has no cervical adenopathy.   Neurological: She is alert and oriented to person, place, and time.   Skin: Skin is warm, dry and intact. No rash noted. No cyanosis or erythema. No pallor.   Left arm bruising noted left wrist, AC (yellow discoloration)   Psychiatric: She has a normal mood and affect. Her behavior is normal. Thought content normal.   Vitals reviewed.      Lab and Diagnostic Review:  Lab Results   Component Value Date    WBC 8.39 07/09/2019    HGB 15.0 07/09/2019    HCT 47.2 (H) 07/09/2019    MCV 97.3 (H) 07/09/2019     07/09/2019     Lab Results   Component Value Date    GLUCOSE 199 (H) 07/09/2019    CALCIUM 9.4 07/09/2019     (L) 07/09/2019    K 3.9 07/09/2019    CO2 23.0 07/09/2019    CL 97 (L) 07/09/2019    BUN 14 07/09/2019    CREATININE 1.04 (H) 07/09/2019    EGFRIFNONA 55 (L) 07/09/2019    BCR 13.5 07/09/2019    ANIONGAP 15.0 07/09/2019     Lab Results   Component Value Date    TSH 3.330 07/08/2019       Assessment and Plan:         1. Coronary artery disease involving native coronary artery of native heart without angina pectoris  S/p stents  Asa, statin, BB, statin    2. ST elevation myocardial infarction involving left circumflex coronary artery (CMS/HCC)  S/p stents  No cP    3. Hyperlipidemia LDL goal <70  statin    4. Tobacco abuse  Discuss risk factors, importance of cessation, cessation aids. Pt states she is not interested in cessation aids at this time.  \"I'm not ready\".  Education materials given for use of [employer]. (education time >3 minutes)        *Please note that portions of this note were completed with a voice recognition program. Efforts were made to edit the dictations, but occasionally words are mistranscribed.    "

## 2019-08-09 PROBLEM — I25.119 CORONARY ARTERY DISEASE INVOLVING NATIVE CORONARY ARTERY OF NATIVE HEART WITH ANGINA PECTORIS (HCC): Status: ACTIVE | Noted: 2019-07-08

## 2019-08-09 NOTE — PROGRESS NOTES
Encounter Date:08/13/2019    Patient ID: Rosario Marroquin is a 54 y.o. female who resides in [place], [place]    CC/Reason for visit:  Coronary Artery Disease           Problem List Items Addressed This Visit        Cardiovascular and Mediastinum    Coronary artery disease involving native coronary artery of native heart with angina pectoris (CMS/[facility]) - Primary    Overview     · Cardiac catheterization for inferior lateral STEMI (7/8/2019): Severe 3-vessel CAD (LCx, LAD, and RPDA).  Culprit for STEMI was 100% occlusion of mid LCx status post Xience GERTRUDE.  Severe proximal LAD stenosis status post Xience GERTRUDE.  · Echo 07/08/19:  60%, cardiac valve anatomically and functionally normal.          Current Assessment & Plan     · Continue dual antiplatelet therapy with aspirin and Plavix until 07/08/2020  · Sublingual nitroglycerin for any episodes of angina  · Continue metoprolol tartrate 25 mg twice daily         Hyperlipidemia LDL goal <70    Current Assessment & Plan     · Continue Lipitor 80 mg daily  · CMP and lipid profile 1 week         Relevant Orders    Comprehensive Metabolic Panel    Lipid Panel    Essential hypertension    Current Assessment & Plan     · Blood pressure is borderline  · Continue metoprolol tartrate 25 mg twice daily  · Start hydrochlorothiazide 12.5 mg daily  · Continue lisinopril 5 mg daily  · CMP in 1 week         Relevant Medications    hydrochlorothiazide (MICROZIDE) 12.5 MG capsule    Other Relevant Orders    Comprehensive Metabolic Panel       Genitourinary    CKD (chronic kidney disease) stage 3, GFR 30-59 ml/min (CMS/[facility])    Current Assessment & Plan     · CMP in 1 week         Relevant Medications    hydrochlorothiazide (MICROZIDE) 12.5 MG capsule       Other    Tobacco abuse    Current Assessment & Plan     · Immediate smoking cessation             Patient has no further angina or heart failure symptoms since discharge.  She is borderline hypertensive and is complaining  of lower extremity edema.  I will start hydrochlorothiazide 12.5 mg daily.  We will continue her metoprolol, dual antiplatelet therapy and lisinopril.  She will need a CMP and lipid profile in 6 weeks.  I spent over 10 minutes counseling the patient on the importance of smoking cessation but she is not agreeable to quitting.  I did congratulate her on decreasing the number cigarettes smoked.       · Hydrochlorothiazide 12.5 mg daily  · Continue dual antiplatelet therapy, metoprolol, Lipitor and lisinopril  · CMP and lipid profile in 6 weeks  · Recommend immediate smoking cessation but patient not agreeable at this time  Return in about 6 months (around 2/13/2020) for Follow-up with Dr. Newman next visit.            Rosario Marroquin returns today for follow-up after recent hospitalization where she presented as an inferior/lateral ST elevation MI.  Coronary angiography revealed 100% occlusion of the mid circumflex that was treated with a drug-eluting stent.  She was also found to have severe proximal LAD stenosis treated with a drug-eluting stent as well.  Echocardiogram showed preserved LVEF function.  Since discharge patient has had no further chest pain symptoms.  She denies chest pain, dyspnea, orthopnea, palpitations or syncope.  He has noticed since being taken off her hydrochlorothiazide she has had some swelling.  Her blood pressure is a little more elevated than usual.  She is taking aspirin and Plavix without any bleeding issues.  She is continuing to smoke cigarettes but has decreased the number smoked to 5/day.  She does report having chronic kidney issues that have been stable for years.  She has been taking daily statin therapy without reports myalgias.  Has not had repeat blood work to ensure therapeutic effectiveness.        Review of Systems   Constitution: Negative for weakness and malaise/fatigue.   HENT: Positive for nosebleeds.    Eyes: Negative for vision loss in left eye and vision  "loss in right eye.   Cardiovascular: Negative for chest pain, dyspnea on exertion, near-syncope, orthopnea, palpitations, paroxysmal nocturnal dyspnea and syncope.   Hematologic/Lymphatic: Bruises/bleeds easily.   Musculoskeletal: Positive for back pain, joint pain, joint swelling and muscle cramps. Negative for myalgias.   Neurological: Negative for brief paralysis, excessive daytime sleepiness, focal weakness, numbness and paresthesias.   All other systems reviewed and are negative.      The patient's past medical, social, family history and ROS reviewed in the patient's electronic medical record.    Allergies  Keflex [cephalexin]    Outpatient Medications Marked as Taking for the 8/13/19 encounter (Office Visit) with Francheska Broderick APRN   Medication Sig Dispense Refill   • acetaminophen (TYLENOL) 500 MG tablet Take 500 mg by mouth Every 6 (Six) Hours As Needed for Mild Pain .     • aspirin 81 MG EC tablet Take 1 tablet by mouth Daily. 90 tablet 3   • atorvastatin (LIPITOR) 80 MG tablet Take 1 tablet by mouth Every Night. 90 tablet 0   • citalopram (CeleXA) 40 MG tablet Take 40 mg by mouth Daily.     • clopidogrel (PLAVIX) 75 MG tablet Take 1 tablet by mouth Daily. 90 tablet 3   • gabapentin (NEURONTIN) 100 MG capsule Take 100 mg by mouth every night at bedtime.     • lisinopril (PRINIVIL,ZESTRIL) 5 MG tablet Take 1 tablet by mouth Daily. 90 tablet 0   • metoprolol tartrate (LOPRESSOR) 25 MG tablet Take 1 tablet by mouth Every 12 (Twelve) Hours. 180 tablet 0   • naproxen (NAPROSYN) 250 MG tablet Take 250 mg by mouth 2 (Two) Times a Day As Needed.     • nitroglycerin (NITROSTAT) 0.4 MG SL tablet Dissolve 1 tablet under the tongue as needed for chest pain, may repeat every 5 minutes for up three doses 25 tablet 11           Blood pressure 140/82, pulse 59, height 167.6 cm (66\"), weight 95.9 kg (211 lb 6.4 oz), SpO2 94 %.  Body mass index is 34.12 kg/m².  Vitals:    08/13/19 1430   Patient Position: Sitting "       Physical Exam   Constitutional: She is oriented to person, place, and time. She appears well-developed and well-nourished.   HENT:   Head: Normocephalic and atraumatic.   Eyes: Pupils are equal, round, and reactive to light. No scleral icterus.   Neck: No JVD present. Carotid bruit is not present. No thyromegaly present.   Cardiovascular: Normal rate and regular rhythm. Exam reveals no gallop.   No murmur heard.  Pulmonary/Chest: Effort normal and breath sounds normal.   Abdominal: Soft. She exhibits no distension. There is no hepatosplenomegaly.   Musculoskeletal: She exhibits no edema.   Neurological: She is alert and oriented to person, place, and time.   Skin: Skin is warm and dry.   Psychiatric: She has a normal mood and affect. Her behavior is normal.       Data Review (reviewed with patient):     Procedures    Lab Results   Component Value Date    CHOL 165 07/08/2019    TRIG 57 07/08/2019    HDL 49 07/08/2019     (H) 07/08/2019     (H) 07/08/2019    ALT 31 07/08/2019       Lab Results   Component Value Date    HGBA1C 5.70 (H) 07/08/2019           Francheska Broderick, APRN  8/13/2019

## 2019-08-13 ENCOUNTER — OFFICE VISIT (OUTPATIENT)
Dept: CARDIOLOGY | Facility: CLINIC | Age: 54
End: 2019-08-13

## 2019-08-13 VITALS
HEART RATE: 59 BPM | SYSTOLIC BLOOD PRESSURE: 140 MMHG | BODY MASS INDEX: 33.97 KG/M2 | HEIGHT: 66 IN | WEIGHT: 211.4 LBS | DIASTOLIC BLOOD PRESSURE: 82 MMHG | OXYGEN SATURATION: 94 %

## 2019-08-13 DIAGNOSIS — Z72.0 TOBACCO ABUSE: ICD-10-CM

## 2019-08-13 DIAGNOSIS — I10 ESSENTIAL HYPERTENSION: ICD-10-CM

## 2019-08-13 DIAGNOSIS — N18.30 CKD (CHRONIC KIDNEY DISEASE) STAGE 3, GFR 30-59 ML/MIN (HCC): ICD-10-CM

## 2019-08-13 DIAGNOSIS — I25.119 CORONARY ARTERY DISEASE INVOLVING NATIVE CORONARY ARTERY OF NATIVE HEART WITH ANGINA PECTORIS (HCC): Primary | ICD-10-CM

## 2019-08-13 DIAGNOSIS — E78.5 HYPERLIPIDEMIA LDL GOAL <70: ICD-10-CM

## 2019-08-13 PROCEDURE — 99214 OFFICE O/P EST MOD 30 MIN: CPT | Performed by: NURSE PRACTITIONER

## 2019-08-13 RX ORDER — NAPROXEN 250 MG/1
250 TABLET ORAL 2 TIMES DAILY PRN
COMMUNITY
End: 2020-05-29 | Stop reason: ALTCHOICE

## 2019-08-13 RX ORDER — ACETAMINOPHEN 500 MG
500 TABLET ORAL EVERY 6 HOURS PRN
COMMUNITY

## 2019-08-13 RX ORDER — HYDROCHLOROTHIAZIDE 12.5 MG/1
12.5 CAPSULE, GELATIN COATED ORAL DAILY
Qty: 90 CAPSULE | Refills: 3 | Status: SHIPPED | OUTPATIENT
Start: 2019-08-13

## 2019-08-13 NOTE — ASSESSMENT & PLAN NOTE
· Continue dual antiplatelet therapy with aspirin and Plavix until 07/08/2020  · Sublingual nitroglycerin for any episodes of angina  · Continue metoprolol tartrate 25 mg twice daily

## 2019-08-13 NOTE — ASSESSMENT & PLAN NOTE
· Blood pressure is borderline  · Continue metoprolol tartrate 25 mg twice daily  · Start hydrochlorothiazide 12.5 mg daily  · Continue lisinopril 5 mg daily  · CMP in 1 week

## 2019-09-24 ENCOUNTER — DOCUMENTATION (OUTPATIENT)
Dept: CARDIOLOGY | Facility: CLINIC | Age: 54
End: 2019-09-24

## 2019-09-24 NOTE — PROGRESS NOTES
The patient was supposed to get a CMP and lipid profile in 6 weeks from her clinic visit last month.  This had not been completed.  I will have our nurse Elaine send a reminder to the patient.      Francheska MEANS.

## 2019-09-25 ENCOUNTER — TELEPHONE (OUTPATIENT)
Dept: CARDIOLOGY | Facility: CLINIC | Age: 54
End: 2019-09-25

## 2019-09-25 NOTE — TELEPHONE ENCOUNTER
CMP and lipid profile ordered the patient's last visit.  This is not been performed.  I will have her nurse Kia to reach out as a reminder.

## 2020-02-19 PROBLEM — I25.10 CORONARY ARTERY DISEASE: Status: RESOLVED | Noted: 2019-07-16 | Resolved: 2020-02-19

## 2020-05-29 ENCOUNTER — OFFICE VISIT (OUTPATIENT)
Dept: CARDIOLOGY | Facility: CLINIC | Age: 55
End: 2020-05-29

## 2020-05-29 VITALS
HEART RATE: 66 BPM | DIASTOLIC BLOOD PRESSURE: 74 MMHG | HEIGHT: 66 IN | SYSTOLIC BLOOD PRESSURE: 112 MMHG | BODY MASS INDEX: 31.12 KG/M2 | WEIGHT: 193.6 LBS

## 2020-05-29 DIAGNOSIS — N18.30 CKD (CHRONIC KIDNEY DISEASE) STAGE 3, GFR 30-59 ML/MIN (HCC): ICD-10-CM

## 2020-05-29 DIAGNOSIS — I25.119 CORONARY ARTERY DISEASE INVOLVING NATIVE CORONARY ARTERY OF NATIVE HEART WITH ANGINA PECTORIS (HCC): Primary | ICD-10-CM

## 2020-05-29 DIAGNOSIS — Z72.0 TOBACCO ABUSE: ICD-10-CM

## 2020-05-29 DIAGNOSIS — E78.5 HYPERLIPIDEMIA LDL GOAL <70: ICD-10-CM

## 2020-05-29 DIAGNOSIS — I10 ESSENTIAL HYPERTENSION: ICD-10-CM

## 2020-05-29 PROCEDURE — 99214 OFFICE O/P EST MOD 30 MIN: CPT | Performed by: NURSE PRACTITIONER

## 2020-05-29 RX ORDER — AMLODIPINE BESYLATE 5 MG/1
5 TABLET ORAL NIGHTLY
COMMUNITY

## 2020-05-29 RX ORDER — CARISOPRODOL 350 MG/1
350 TABLET ORAL 2 TIMES DAILY
COMMUNITY

## 2020-05-29 NOTE — PROGRESS NOTES
[facility]  Office Visit Note    Encounter Date:05/29/2020    Patient ID: Rosario Marroquin is a 55 y.o. female who resides in [place], [place]    CC/Reason for visit:    • Coronary artery disease  • Hypertension  • Hyperlipidemia         Problem List Items Addressed This Visit        Cardiovascular and Mediastinum    Coronary artery disease involving native coronary artery of native heart with angina pectoris (CMS/[facility]) - Primary    Overview     · Cardiac catheterization for inferior lateral STEMI (7/8/2019): Severe 3-vessel CAD (LCx, LAD, and RPDA).  Culprit for STEMI was 100% occlusion of mid LCx status post Xience GERTRUDE.  Severe proximal LAD stenosis status post Xience GERTRUDE.  · Echo (7/08/19):  LVEF 60%.  Normal valves.         Current Assessment & Plan     · Discontinue Plavix  · Patient can proceed with back surgery with Dr. Sánchez  · Continue aspirin 81 mg daily  · Continue metoprolol tartrate 25 mg twice daily  · Continue sublingual nitroglycerin for episodes of angina         Relevant Medications    amLODIPine (NORVASC) 5 MG tablet    Essential hypertension    Overview     • Target blood pressure <130/80 mmHg         Current Assessment & Plan     · Hypertension is controlled  · Continue metoprolol tartrate 25 mg twice daily  · Continue hydrochlorothiazide 12.5 mg daily  · Continue lisinopril 5 mg daily  · Continue morphine 5 mg daily         Relevant Medications    amLODIPine (NORVASC) 5 MG tablet    Hyperlipidemia LDL goal <70    Overview     • High intensity statin therapy indicated given the presence of CAD         Current Assessment & Plan     · Continue Lipitor 80 mg daily            Genitourinary    CKD (chronic kidney disease) stage 3, GFR 30-59 ml/min (CMS/[facility])    Current Assessment & Plan     · Creatinine stable at 1.13            Other    Tobacco abuse    Current Assessment & Plan     · Patient needs to stop smoking             Patient has no  signs or symptoms of angina or heart failure.  Since she is 11 months post PCI we will go ahead and discontinue her Plavix but she should continue aspirin lifelong.  We will fax over clearance to Dr. Sánchez and she can proceed with back surgery.  We will continue all of her other medicines as prescribed.  She needs to stop smoking but she does not appear quite ready.  She will follow-up 6 months or sooner if needed.  At next follow-up we will repeat lipid panel.        · Discontinue Plavix as patient is almost 12 months post PCI  · Proceed with back surgery with Dr. Sánchez  · Continue all other medicines as prescribed  · At next visit obtain lipid panel  Return in about 6 months (around 11/29/2020), or if symptoms worsen or fail to improve.              Rosario Marroquin returns today for follow-up for coronary artery disease, hypertension and hyperlipidemia.  Last visit the patient was started on low-dose hydrochlorothiazide due to uncontrolled hypertension.  Repeat blood work showed stable creatinine 1.13.  Blood pressure is controlled at today's visit.  She is 11 months post PCI and continues on dual antiplatelet therapy.  She denies chest pain, dyspnea, orthopnea, palpitations or syncope.  She has severe degenerative disc disease and is scheduled to undergo back surgery with Dr. Sánchez in [place] and is requesting plant clearance.  He is also wanting her to be off Plavix 5 days prior to procedure.  She continues to smoke approximately 1 pack of cigarettes per day.  She is on daily statin therapy and tolerating without myalgias.  Last LDL was 76.    Review of Systems   Constitution: Negative for malaise/fatigue.   Eyes: Negative for vision loss in left eye and vision loss in right eye.   Cardiovascular: Negative for chest pain, dyspnea on exertion, near-syncope, orthopnea, palpitations, paroxysmal nocturnal dyspnea and syncope.   Musculoskeletal: Negative for myalgias.   Neurological: Negative for  brief paralysis, excessive daytime sleepiness, focal weakness, numbness, paresthesias and weakness.   All other systems reviewed and are negative.      The patient's past medical, social, family history and ROS reviewed in the patient's electronic medical record.    Allergies   Allergen Reactions   • Keflex [Cephalexin] Swelling     Unknown           Current Outpatient Medications:   •  acetaminophen (TYLENOL) 500 MG tablet, Take 500 mg by mouth Every 6 (Six) Hours As Needed for Mild Pain ., Disp: , Rfl:   •  amLODIPine (NORVASC) 5 MG tablet, Take 5 mg by mouth Every Night., Disp: , Rfl:   •  aspirin 81 MG EC tablet, Take 1 tablet by mouth Daily., Disp: 90 tablet, Rfl: 3  •  atorvastatin (LIPITOR) 80 MG tablet, Take 1 tablet by mouth Every Night., Disp: 90 tablet, Rfl: 0  •  carisoprodol (SOMA) 350 MG tablet, Take 350 mg by mouth 2 (Two) Times a Day., Disp: , Rfl:   •  gabapentin (NEURONTIN) 100 MG capsule, Take 400 mg by mouth 3 (Three) Times a Day., Disp: , Rfl:   •  hydrochlorothiazide (MICROZIDE) 12.5 MG capsule, Take 1 capsule by mouth Daily., Disp: 90 capsule, Rfl: 3  •  lisinopril (PRINIVIL,ZESTRIL) 5 MG tablet, Take 1 tablet by mouth Daily., Disp: 90 tablet, Rfl: 0  •  metoprolol tartrate (LOPRESSOR) 25 MG tablet, Take 1 tablet by mouth Every 12 (Twelve) Hours., Disp: 180 tablet, Rfl: 0  •  nitroglycerin (NITROSTAT) 0.4 MG SL tablet, Dissolve 1 tablet under the tongue as needed for chest pain, may repeat every 5 minutes for up three doses, Disp: 25 tablet, Rfl: 11    Past Medical History:   Diagnosis Date   • AMI (acute myocardial infarction) (CMS/[facility]) 07/08/2019    GERTRUDE to left circumflex and LAD   • COPD (chronic obstructive pulmonary disease) (CMS/[facility])    • Coronary artery disease    • Elevated cholesterol    • History of transfusion    • Hypertension        Past Surgical History:   Procedure Laterality Date   • CARDIAC CATHETERIZATION N/A 7/8/2019    Procedure: LEFT HEART CATH;  Surgeon: Vimal Newman  "Panchito AMAYA IV, MD;  Location:  [facility];  Service: Cardiovascular   • CARDIAC CATHETERIZATION N/A 2019    Procedure: Optical Coherent Tomography;  Surgeon: Vimal Newman IV, MD;  Location:  [facility];  Service: Cardiovascular   • CARDIAC CATHETERIZATION N/A 2019    Procedure: Stent GERTRUDE coronary;  Surgeon: Vimal Newman IV, MD;  Location:  [facility];  Service: Cardiovascular   • CARDIAC CATHETERIZATION  2019    Procedure: Percutaneous Manual Thrombectomy;  Surgeon: Vimal Newman IV, MD;  Location:  [facility];  Service: Cardiovascular   •  SECTION     • CHOLECYSTECTOMY     • HERNIA REPAIR      mesh       Family History   Problem Relation Age of Onset   • COPD Mother    • Cancer Father    • COPD Sister    • COPD Brother    • Emphysema Maternal Grandmother    • No Known Problems Maternal Grandfather    • No Known Problems Paternal Grandmother    • No Known Problems Paternal Grandfather    • Heart attack Neg Hx        Social History     Tobacco Use   • Smoking status: Current Every Day Smoker     Packs/day: 0.50     Types: Cigarettes   • Smokeless tobacco: Never Used   Substance Use Topics   • Alcohol use: No     Alcohol/week: 2.0 standard drinks     Types: 2 Cans of beer per week     Frequency: Never     Comment: quit since heart attack           Blood pressure 112/74, pulse 66, height 167.6 cm (66\"), weight 87.8 kg (193 lb 9.6 oz).  Body mass index is 31.25 kg/m².  Vitals:    20 1436   Patient Position: Sitting       Physical Exam   Constitutional: She is oriented to person, place, and time. She appears well-developed and well-nourished.   HENT:   Head: Normocephalic and atraumatic.   Eyes: Conjunctivae are normal. No scleral icterus.   Neck: Normal range of motion. No JVD present. Carotid bruit is not present. No thyromegaly present.   Cardiovascular: Normal rate and regular rhythm. " Exam reveals no gallop.   No murmur heard.  Pulmonary/Chest: Effort normal and breath sounds normal.   Abdominal: Soft. She exhibits no distension and no mass. There is no hepatosplenomegaly.   Musculoskeletal: She exhibits no edema.   Neurological: She is alert and oriented to person, place, and time.   Skin: Skin is warm and dry. No rash noted.   Psychiatric: She has a normal mood and affect. Her behavior is normal.       Data Review (reviewed with patient):     Procedures    Lab Results   Component Value Date    CHOL 165 07/08/2019    TRIG 57 07/08/2019    HDL 49 07/08/2019     (H) 07/08/2019     (H) 07/08/2019    ALT 31 07/08/2019       Lab Results   Component Value Date    HGBA1C 5.70 (H) 07/08/2019           NHUNG Hammonds, CCRN    5/29/2020

## 2020-05-29 NOTE — ASSESSMENT & PLAN NOTE
· Discontinue Plavix  · Patient can proceed with back surgery with Dr. Sánchez  · Continue aspirin 81 mg daily  · Continue metoprolol tartrate 25 mg twice daily  · Continue sublingual nitroglycerin for episodes of angina

## 2020-05-29 NOTE — ASSESSMENT & PLAN NOTE
· Hypertension is controlled  · Continue metoprolol tartrate 25 mg twice daily  · Continue hydrochlorothiazide 12.5 mg daily  · Continue lisinopril 5 mg daily  · Continue morphine 5 mg daily

## (undated) DEVICE — GUIDE CATHETER: Brand: MACH1™

## (undated) DEVICE — DEV COMP RAD PRELUDESYNC 24CM

## (undated) DEVICE — TREK CORONARY DILATATION CATHETER 3.0 MM X 20 MM / RAPID-EXCHANGE: Brand: TREK

## (undated) DEVICE — NC TREK CORONARY DILATATION CATHETER 3.0 MM X 15 MM / RAPID-EXCHANGE: Brand: NC TREK

## (undated) DEVICE — MODEL BT2000 P/N 700287-012KIT CONTENTS: MANIFOLD WITH SALINE AND CONTRAST PORTS, SALINE TUBING WITH SPIKE AND HAND SYRINGE, TRANSDUCER: Brand: BT2000 AUTOMATED MANIFOLD KIT

## (undated) DEVICE — NC TREK CORONARY DILATATION CATHETER 2.75 MM X 12 MM / RAPID-EXCHANGE: Brand: NC TREK

## (undated) DEVICE — GLIDESHEATH BASIC HYDROPHILIC COATED INTRODUCER SHEATH: Brand: GLIDESHEATH

## (undated) DEVICE — CATH IMG DRAGONFLY OPTIS 2.7F 135CM

## (undated) DEVICE — CATH ASPIR EXPORT AP .014IN 6F140CM

## (undated) DEVICE — Device

## (undated) DEVICE — DEV INFL MONARCH 25W

## (undated) DEVICE — CATH DIAG EXPO M/ PK 6FR FL4/FR4 PIG 3PK

## (undated) DEVICE — NC TREK CORONARY DILATATION CATHETER 4.0 MM X 12 MM / RAPID-EXCHANGE: Brand: NC TREK

## (undated) DEVICE — TREK CORONARY DILATATION CATHETER 2.50 MM X 15 MM / RAPID-EXCHANGE: Brand: TREK

## (undated) DEVICE — CATH DIAG EXPO .056 FL3.5 6F 100CM

## (undated) DEVICE — MODEL AT P65, P/N 701554-001KIT CONTENTS: HAND CONTROLLER, 3-WAY HIGH-PRESSURE STOPCOCK WITH ROTATING END AND PREMIUM HIGH-PRESSURE TUBING: Brand: ANGIOTOUCH® KIT

## (undated) DEVICE — PK CATH CARD 10

## (undated) DEVICE — Device: Brand: ASAHI SION BLUE

## (undated) DEVICE — NC TREK CORONARY DILATATION CATHETER 2.75 MM X 20 MM / RAPID-EXCHANGE: Brand: NC TREK